# Patient Record
Sex: MALE | Race: WHITE | NOT HISPANIC OR LATINO | Employment: OTHER | URBAN - NONMETROPOLITAN AREA
[De-identification: names, ages, dates, MRNs, and addresses within clinical notes are randomized per-mention and may not be internally consistent; named-entity substitution may affect disease eponyms.]

---

## 2018-01-31 ENCOUNTER — OFFICE VISIT CONVERTED (OUTPATIENT)
Dept: FAMILY MEDICINE CLINIC | Age: 63
End: 2018-01-31
Attending: FAMILY MEDICINE

## 2018-04-04 ENCOUNTER — OFFICE VISIT CONVERTED (OUTPATIENT)
Dept: NEUROLOGY | Facility: CLINIC | Age: 63
End: 2018-04-04
Attending: PSYCHIATRY & NEUROLOGY

## 2018-04-04 ENCOUNTER — CONVERSION ENCOUNTER (OUTPATIENT)
Dept: NEUROLOGY | Facility: CLINIC | Age: 63
End: 2018-04-04

## 2018-06-30 ENCOUNTER — OFFICE VISIT CONVERTED (OUTPATIENT)
Dept: FAMILY MEDICINE CLINIC | Age: 63
End: 2018-06-30
Attending: FAMILY MEDICINE

## 2018-08-17 ENCOUNTER — OFFICE VISIT CONVERTED (OUTPATIENT)
Dept: FAMILY MEDICINE CLINIC | Age: 63
End: 2018-08-17
Attending: FAMILY MEDICINE

## 2018-10-05 ENCOUNTER — LAB REQUISITION (OUTPATIENT)
Dept: LAB | Facility: HOSPITAL | Age: 63
End: 2018-10-05

## 2018-10-05 DIAGNOSIS — Z00.00 ROUTINE GENERAL MEDICAL EXAMINATION AT A HEALTH CARE FACILITY: ICD-10-CM

## 2018-10-05 PROCEDURE — 36415 COLL VENOUS BLD VENIPUNCTURE: CPT

## 2018-10-24 ENCOUNTER — OFFICE VISIT CONVERTED (OUTPATIENT)
Dept: NEUROLOGY | Facility: CLINIC | Age: 63
End: 2018-10-24
Attending: PSYCHIATRY & NEUROLOGY

## 2018-10-24 ENCOUNTER — CONVERSION ENCOUNTER (OUTPATIENT)
Dept: NEUROLOGY | Facility: CLINIC | Age: 63
End: 2018-10-24

## 2018-11-14 ENCOUNTER — OFFICE VISIT CONVERTED (OUTPATIENT)
Dept: FAMILY MEDICINE CLINIC | Age: 63
End: 2018-11-14
Attending: FAMILY MEDICINE

## 2018-12-07 ENCOUNTER — OFFICE VISIT CONVERTED (OUTPATIENT)
Dept: PHYSICAL THERAPY | Facility: CLINIC | Age: 63
End: 2018-12-07
Attending: ORTHOPAEDIC SURGERY

## 2019-01-23 ENCOUNTER — OFFICE VISIT CONVERTED (OUTPATIENT)
Dept: NEUROLOGY | Facility: CLINIC | Age: 64
End: 2019-01-23
Attending: PSYCHIATRY & NEUROLOGY

## 2019-01-31 ENCOUNTER — OFFICE VISIT CONVERTED (OUTPATIENT)
Dept: ORTHOPEDIC SURGERY | Facility: CLINIC | Age: 64
End: 2019-01-31
Attending: ORTHOPAEDIC SURGERY

## 2019-02-13 ENCOUNTER — OFFICE VISIT CONVERTED (OUTPATIENT)
Dept: NEUROLOGY | Facility: CLINIC | Age: 64
End: 2019-02-13
Attending: PHYSICIAN ASSISTANT

## 2019-04-24 ENCOUNTER — OFFICE VISIT CONVERTED (OUTPATIENT)
Dept: NEUROLOGY | Facility: CLINIC | Age: 64
End: 2019-04-24
Attending: PSYCHIATRY & NEUROLOGY

## 2019-04-24 ENCOUNTER — CONVERSION ENCOUNTER (OUTPATIENT)
Dept: NEUROLOGY | Facility: CLINIC | Age: 64
End: 2019-04-24

## 2019-05-21 ENCOUNTER — OFFICE VISIT CONVERTED (OUTPATIENT)
Dept: FAMILY MEDICINE CLINIC | Age: 64
End: 2019-05-21
Attending: FAMILY MEDICINE

## 2019-05-23 ENCOUNTER — HOSPITAL ENCOUNTER (OUTPATIENT)
Dept: OTHER | Facility: HOSPITAL | Age: 64
Discharge: HOME | End: 2019-05-23
Attending: FAMILY MEDICINE

## 2019-05-23 LAB
ALBUMIN SERPL-MCNC: 4.5 G/DL (ref 3.5–5)
ALBUMIN/GLOB SERPL: 1.7 {RATIO} (ref 1.4–2.6)
ALP SERPL-CCNC: 64 U/L (ref 56–155)
ALT SERPL-CCNC: 24 U/L (ref 10–40)
ANION GAP SERPL CALC-SCNC: 22 MMOL/L (ref 8–19)
AST SERPL-CCNC: 22 U/L (ref 15–50)
BASOPHILS # BLD MANUAL: 0.02 10*3/UL (ref 0–0.2)
BASOPHILS NFR BLD MANUAL: 0.3 % (ref 0–3)
BILIRUB SERPL-MCNC: 0.45 MG/DL (ref 0.2–1.3)
BUN SERPL-MCNC: 24 MG/DL (ref 5–25)
BUN/CREAT SERPL: 24 {RATIO} (ref 6–20)
CALCIUM SERPL-MCNC: 9.4 MG/DL (ref 8.7–10.4)
CHLORIDE SERPL-SCNC: 107 MMOL/L (ref 99–111)
CHOLEST SERPL-MCNC: 245 MG/DL (ref 107–200)
CHOLEST/HDLC SERPL: 4.9 {RATIO} (ref 3–6)
CONV CO2: 23 MMOL/L (ref 22–32)
CONV TOTAL PROTEIN: 7.2 G/DL (ref 6.3–8.2)
CREAT UR-MCNC: 1.01 MG/DL (ref 0.7–1.2)
DEPRECATED RDW RBC AUTO: 45 FL
EOSINOPHIL # BLD MANUAL: 0.15 10*3/UL (ref 0–0.7)
EOSINOPHIL NFR BLD MANUAL: 2.4 % (ref 0–7)
ERYTHROCYTE [DISTWIDTH] IN BLOOD BY AUTOMATED COUNT: 13.3 % (ref 11.5–14.5)
GFR SERPLBLD BASED ON 1.73 SQ M-ARVRAT: >60 ML/MIN/{1.73_M2}
GLOBULIN UR ELPH-MCNC: 2.7 G/DL (ref 2–3.5)
GLUCOSE SERPL-MCNC: 95 MG/DL (ref 70–99)
GRANS (ABSOLUTE): 3.18 10*3/UL (ref 2–8)
GRANS: 50.9 % (ref 30–85)
HBA1C MFR BLD: 15.2 G/DL (ref 14–18)
HCT VFR BLD AUTO: 45.3 % (ref 42–52)
HDLC SERPL-MCNC: 50 MG/DL (ref 40–60)
IMM GRANULOCYTES # BLD: 0.02 10*3/UL (ref 0–0.54)
IMM GRANULOCYTES NFR BLD: 0.3 % (ref 0–0.43)
LDLC SERPL CALC-MCNC: 175 MG/DL (ref 70–100)
LYMPHOCYTES # BLD MANUAL: 2.3 10*3/UL (ref 1–5)
LYMPHOCYTES NFR BLD MANUAL: 9.4 % (ref 3–10)
MCH RBC QN AUTO: 30.5 PG (ref 27–31)
MCHC RBC AUTO-ENTMCNC: 33.6 G/DL (ref 33–37)
MCV RBC AUTO: 91 FL (ref 80–96)
MONOCYTES # BLD AUTO: 0.59 10*3/UL (ref 0.2–1.2)
OSMOLALITY SERPL CALC.SUM OF ELEC: 310 MOSM/KG (ref 273–304)
PLATELET # BLD AUTO: 197 10*3/UL (ref 130–400)
PMV BLD AUTO: 9.9 FL (ref 7.4–10.4)
POTASSIUM SERPL-SCNC: 4.3 MMOL/L (ref 3.5–5.3)
PSA SERPL-MCNC: 0.93 NG/ML (ref 0–4)
RBC # BLD AUTO: 4.98 10*6/UL (ref 4.7–6.1)
SODIUM SERPL-SCNC: 148 MMOL/L (ref 135–147)
TRIGL SERPL-MCNC: 100 MG/DL (ref 40–150)
VARIANT LYMPHS NFR BLD MANUAL: 36.7 % (ref 20–45)
VLDLC SERPL-MCNC: 20 MG/DL (ref 5–37)
WBC # BLD AUTO: 6.26 10*3/UL (ref 4.8–10.8)

## 2019-05-24 LAB
CONV HEPATITIS C AB WITH REFLEX TO CONFIRMATION: <0.1 S/CO RATIO (ref 0–0.9)
CONV HEPATITIS COMMENT: NORMAL

## 2019-09-09 ENCOUNTER — OFFICE VISIT CONVERTED (OUTPATIENT)
Dept: FAMILY MEDICINE CLINIC | Age: 64
End: 2019-09-09
Attending: FAMILY MEDICINE

## 2019-09-09 ENCOUNTER — HOSPITAL ENCOUNTER (OUTPATIENT)
Dept: OTHER | Facility: HOSPITAL | Age: 64
Discharge: HOME | End: 2019-09-09
Attending: FAMILY MEDICINE

## 2019-09-09 LAB
ALBUMIN SERPL-MCNC: 4.5 G/DL (ref 3.5–5)
ALBUMIN/GLOB SERPL: 1.7 {RATIO} (ref 1.4–2.6)
ALP SERPL-CCNC: 66 U/L (ref 56–155)
ALT SERPL-CCNC: 39 U/L (ref 10–40)
ANION GAP SERPL CALC-SCNC: 18 MMOL/L (ref 8–19)
AST SERPL-CCNC: 30 U/L (ref 15–50)
BILIRUB SERPL-MCNC: 0.51 MG/DL (ref 0.2–1.3)
BUN SERPL-MCNC: 19 MG/DL (ref 5–25)
BUN/CREAT SERPL: 22 {RATIO} (ref 6–20)
CALCIUM SERPL-MCNC: 9.6 MG/DL (ref 8.7–10.4)
CHLORIDE SERPL-SCNC: 104 MMOL/L (ref 99–111)
CHOLEST SERPL-MCNC: 182 MG/DL (ref 107–200)
CHOLEST/HDLC SERPL: 3.3 {RATIO} (ref 3–6)
CONV CO2: 24 MMOL/L (ref 22–32)
CONV TOTAL PROTEIN: 7.1 G/DL (ref 6.3–8.2)
CREAT UR-MCNC: 0.86 MG/DL (ref 0.7–1.2)
GFR SERPLBLD BASED ON 1.73 SQ M-ARVRAT: >60 ML/MIN/{1.73_M2}
GLOBULIN UR ELPH-MCNC: 2.6 G/DL (ref 2–3.5)
GLUCOSE SERPL-MCNC: 80 MG/DL (ref 70–99)
HDLC SERPL-MCNC: 56 MG/DL (ref 40–60)
LDLC SERPL CALC-MCNC: 98 MG/DL (ref 70–100)
OSMOLALITY SERPL CALC.SUM OF ELEC: 295 MOSM/KG (ref 273–304)
POTASSIUM SERPL-SCNC: 4.4 MMOL/L (ref 3.5–5.3)
SODIUM SERPL-SCNC: 142 MMOL/L (ref 135–147)
TRIGL SERPL-MCNC: 141 MG/DL (ref 40–150)
VLDLC SERPL-MCNC: 28 MG/DL (ref 5–37)

## 2019-12-06 ENCOUNTER — OFFICE VISIT CONVERTED (OUTPATIENT)
Dept: FAMILY MEDICINE CLINIC | Age: 64
End: 2019-12-06
Attending: NURSE PRACTITIONER

## 2020-01-13 ENCOUNTER — OFFICE VISIT CONVERTED (OUTPATIENT)
Dept: NEUROLOGY | Facility: CLINIC | Age: 65
End: 2020-01-13
Attending: PSYCHIATRY & NEUROLOGY

## 2020-02-18 ENCOUNTER — OFFICE VISIT CONVERTED (OUTPATIENT)
Dept: NEUROLOGY | Facility: CLINIC | Age: 65
End: 2020-02-18
Attending: PSYCHIATRY & NEUROLOGY

## 2020-03-25 ENCOUNTER — OFFICE VISIT CONVERTED (OUTPATIENT)
Dept: FAMILY MEDICINE CLINIC | Age: 65
End: 2020-03-25
Attending: FAMILY MEDICINE

## 2020-05-28 ENCOUNTER — OFFICE VISIT CONVERTED (OUTPATIENT)
Dept: FAMILY MEDICINE CLINIC | Age: 65
End: 2020-05-28
Attending: FAMILY MEDICINE

## 2020-11-18 ENCOUNTER — OFFICE VISIT CONVERTED (OUTPATIENT)
Dept: FAMILY MEDICINE CLINIC | Age: 65
End: 2020-11-18
Attending: FAMILY MEDICINE

## 2021-01-12 ENCOUNTER — IMMUNIZATION (OUTPATIENT)
Dept: VACCINE CLINIC | Facility: HOSPITAL | Age: 66
End: 2021-01-12

## 2021-01-12 PROCEDURE — 0001A: CPT | Performed by: INTERNAL MEDICINE

## 2021-01-12 PROCEDURE — 91300 HC SARSCOV02 VAC 30MCG/0.3ML IM: CPT | Performed by: INTERNAL MEDICINE

## 2021-02-02 ENCOUNTER — IMMUNIZATION (OUTPATIENT)
Dept: VACCINE CLINIC | Facility: HOSPITAL | Age: 66
End: 2021-02-02

## 2021-02-02 PROCEDURE — 0002A: CPT | Performed by: INTERNAL MEDICINE

## 2021-02-02 PROCEDURE — 91300 HC SARSCOV02 VAC 30MCG/0.3ML IM: CPT | Performed by: INTERNAL MEDICINE

## 2021-05-15 VITALS — OXYGEN SATURATION: 95 % | WEIGHT: 210 LBS | BODY MASS INDEX: 31.83 KG/M2 | HEIGHT: 68 IN | HEART RATE: 64 BPM

## 2021-05-15 VITALS
HEART RATE: 66 BPM | HEIGHT: 68 IN | SYSTOLIC BLOOD PRESSURE: 122 MMHG | DIASTOLIC BLOOD PRESSURE: 73 MMHG | BODY MASS INDEX: 30.94 KG/M2 | WEIGHT: 204.12 LBS

## 2021-05-15 VITALS — HEIGHT: 68 IN | OXYGEN SATURATION: 96 % | BODY MASS INDEX: 31.37 KG/M2 | HEART RATE: 94 BPM | WEIGHT: 207 LBS

## 2021-05-15 VITALS
BODY MASS INDEX: 31.22 KG/M2 | HEART RATE: 64 BPM | SYSTOLIC BLOOD PRESSURE: 145 MMHG | WEIGHT: 206 LBS | DIASTOLIC BLOOD PRESSURE: 81 MMHG | HEIGHT: 68 IN

## 2021-05-15 VITALS
BODY MASS INDEX: 33.04 KG/M2 | HEIGHT: 68 IN | DIASTOLIC BLOOD PRESSURE: 74 MMHG | SYSTOLIC BLOOD PRESSURE: 148 MMHG | HEART RATE: 69 BPM | WEIGHT: 218 LBS

## 2021-05-15 VITALS
BODY MASS INDEX: 32.13 KG/M2 | HEART RATE: 65 BPM | WEIGHT: 212 LBS | DIASTOLIC BLOOD PRESSURE: 82 MMHG | HEIGHT: 68 IN | SYSTOLIC BLOOD PRESSURE: 145 MMHG

## 2021-05-16 VITALS
WEIGHT: 200 LBS | SYSTOLIC BLOOD PRESSURE: 134 MMHG | DIASTOLIC BLOOD PRESSURE: 59 MMHG | BODY MASS INDEX: 30.31 KG/M2 | HEART RATE: 74 BPM | HEIGHT: 68 IN

## 2021-05-16 VITALS
HEIGHT: 68 IN | SYSTOLIC BLOOD PRESSURE: 130 MMHG | DIASTOLIC BLOOD PRESSURE: 76 MMHG | WEIGHT: 199.25 LBS | HEART RATE: 67 BPM | BODY MASS INDEX: 30.2 KG/M2

## 2021-05-18 NOTE — PROGRESS NOTES
William Turcios 1955     Office/Outpatient Visit    Visit Date: Wed, Nov 14, 2018 08:38 am    Provider: Walt Giraldo MD (Assistant: Riddhi Bower MA)    Location: [facility]        Electronically signed by Walt Giraldo MD on  11/14/2018 12:47:25 PM                             SUBJECTIVE:        CC:     Mr. Turcios is a 63 year old White male.  Patient is here for medication consultation, various joint pain, and possible referral.          HPI:         Patient to be evaluated for hTN.  He did not bring his blood pressure diary, but says that pressures have been okay.  He is tolerating the medication well without side effects.  Compliance with treatment has been good.          In regard to the high cholesterol (declines meds), current treatment includes diet.  Compliance with treatment has been fair.  Most recent lab tests include Total Cholesterol:  229 (mg/dL) (03/02/2018), HDL:  48 (mg/dL) (03/02/2018), Triglycerides:  142 (mg/dL) (03/02/2018), LDL:  153 (mg/dL) (03/02/2018).  STOPPED MEDS DUE TO CONCERNS     ROS:     CONSTITUTIONAL:  Negative for chills and fever.      E/N/T:  Negative for ear pain, nasal congestion and sore throat.      CARDIOVASCULAR:  Negative for chest pain, palpitations and pedal edema.      RESPIRATORY:  Negative for recent cough and dyspnea.      GASTROINTESTINAL:  Negative for abdominal pain, nausea and vomiting.      MUSCULOSKELETAL:  Positive for arthralgias (RIGHT KNEE, LEFT HAND.  [facility] LIKE TO SEE [facility]TH[facility]).   Negative for myalgias.      NEUROLOGICAL:  Negative for headaches and weakness.          PMH/FMH/SH:     Last Reviewed on 11/14/2018 08:52 AM by Walt Giraldo    Past Medical History:             PAST MEDICAL HISTORY             CURRENT MEDICAL PROVIDERS:    Neurologist    Pulmonologist         Surgical History:         Positive for    Laminectomy: lumbar region; ;     Positive for    Colonoscopy ( 2005, 2015--NEG;; ) and    Treadmill stress test  ( 2012, 2015--NEG;; );         Family History:         Positive for Coronary Artery Disease and Hypertension.          Social History:     Occupation: TEACHER;     Marital Status:          Tobacco/Alcohol/Supplements:     Last Reviewed on 11/14/2018 08:52 AM by Walt Giraldo    Tobacco: He has never smoked.  Non-drinker     Caffeine:  He admits to consuming caffeine via coffee ( 2 servings per day ).          Substance Abuse History:     Last Reviewed on 11/14/2018 08:52 AM by Walt Giraldo    NEGATIVE         Mental Health History:     Last Reviewed on 11/14/2018 08:40 AM by Riddhi Bower        Communicable Diseases (eg STDs):     Last Reviewed on 11/14/2018 08:40 AM by Riddhi Bower            Current Problems:     Last Reviewed on 11/14/2018 08:54 AM by Walt Giraldo    Generalized osteoarthritis, multiple sites     Obstructive sleep apnea     HTN     High cholesterol (declines meds)     Depression responsive to treatment     Restless legs syndrome (RLS)         Immunizations:     Fluzone (3 + years dose) 10/1/2017     Fluzone Quadrivalent (3+ years) 11/5/2018         Allergies:     Last Reviewed on 11/14/2018 08:52 AM by Walt Giraldo      No Known Drug Allergies.         Current Medications:     Last Reviewed on 11/14/2018 08:53 AM by Walt Giraldo    Lisinopril 10mg Tablet 1 tab daily     Zoloft 50mg Tablet one a day     Mirapex 0.5mg Tablet 5 po QHS     MOUTHPIECE FOR LOTTIE         OBJECTIVE:        Vitals:         Current: 11/14/2018 8:41:07 AM    Ht:  5 ft, 7.5 in;  Wt: 200.6 lbs;  BMI: 31.0    T: 98 F (oral);  BP: 143/80 mm Hg (left arm, sitting);  P: 62 bpm (left arm (BP Cuff), sitting);  sCr: 0.99 mg/dL;  GFR: 75.59        Exams:     PHYSICAL EXAM:     GENERAL: Vitals recorded well developed, well nourished;  well groomed;  no apparent distress;     NECK: trachea is midline; thyroid is non-palpable;     RESPIRATORY: normal respiratory rate and  pattern with no distress; normal breath sounds with no rales, rhonchi, wheezes or rubs;     CARDIOVASCULAR: normal rate; rhythm is regular;  no systolic murmur; no edema;     GASTROINTESTINAL: nontender; no abdominal hernias;     LYMPHATIC: no enlargement of cervical or facial nodes;     NEUROLOGIC: GROSSLY INTACT     PSYCHIATRIC:  appropriate affect and demeanor; normal speech pattern; grossly normal memory;         ASSESSMENT           401.1   I10  HTN              DDx:     272.0   E78.00  High cholesterol (declines meds)              DDx:     715.09   M15.0  Generalized osteoarthritis, multiple sites              DDx:         ORDERS:         Meds Prescribed:       Refill of: Zoloft (Sertraline HCl) 50mg Tablet one a day  #45 (Forty Five) tablet(s) Refills: 1       Refill of: Mirapex (Pramipexole) 0.5mg Tablet 5 po QHS  #450 ([address] [place] [address] [employer]) tablet(s) Refills: 1         Procedures Ordered:       REFER  Referral to Specialist or Other Facility  (Send-Out)                   PLAN:          HTN         MEDICATIONS: (no change to current medication regimen)     RECOMMENDATIONS given include: perform routine monitoring of blood pressure with home blood pressure cuff.      FOLLOW-UP: Schedule a follow-up visit in 6 months. LABS IN THE SPRING          High cholesterol (declines meds)         RECOMMENDATIONS given include: low cholesterol/low fat diet.           Generalized osteoarthritis, multiple sites         REFERRALS:  Referral initiated to an orthopedist ( [facility] ).            Orders:       REFER  Referral to Specialist or Other Facility  (Send-Out)               Other Prescriptions:       Refill of: Zoloft (Sertraline HCl) 50mg Tablet one a day  #45 (Forty Five) tablet(s) Refills: 1       Refill of: Mirapex (Pramipexole) 0.5mg Tablet 5 po QHS  #450 ([address] [place] [address] [employer]) tablet(s) Refills: 1         Patient Recommendations:        For  HTN:     Begin monitoring your blood  pressure by brief nurse visits at our office, a home blood pressure monitor, or by checking on the machines in pharmacies or stores.  Keep a log of the readings.  Schedule a follow-up visit in 6 months.          For  High cholesterol (declines meds):     Reduce the amount of cholesterol and saturated fat in your diet.              CHARGE CAPTURE           **Please note: ICD descriptions below are intended for billing purposes only and may not represent clinical diagnoses**        Primary Diagnosis:         401.1 HTN            I10    Essential (primary) hypertension              Orders:          96928   Office/outpatient visit; established patient, level 4  (In-House)           272.0 High cholesterol (declines meds)            E78.00    Pure hypercholesterolemia, unspecified    715.09 Generalized osteoarthritis, multiple sites            M15.0    Primary generalized (osteo)arthritis

## 2021-05-18 NOTE — PROGRESS NOTES
William Turcios  1955     Office/Outpatient Visit    Visit Date: Thu, May 28, 2020 10:26 am    Provider: Chivo Giraldo MD (Assistant: Dominique Beard MA)    Location: [facility]        Electronically signed by Chivo Giraldo MD on  05/28/2020 01:43:59 PM                             Subjective:        CC: [phone] - dox video Mr. Turcios is a 65 year old White male.  This is a follow-up visit.  W Today's encounter is being done with a telehealth visit. He has consented verbally with two witnesses for todays treatment. Todays visit is being conducted by audio and video. Individuals present during the telemedicine consultation include CHIVO WEEMS MD         HPI:           Mr. Turcios is here for a Medicare wellness visit.  The required HRA questions are integrated within this visit note. Family medical history and individual medical/surgical history were reviewed and updated.  A current height, weight, BMI, blood pressure, and pulse were recorded in the vitals section of the note and have been reviewed. Patient's medications, including supplements, were recorded in the chart and reviewed.  Current providers and suppliers were reviewed and updated.          Self-Assessment of Health: He rates his health as good. He rates his confidence of being able to control/manage most of his health problems as very confident. His physical/emotional health has limited his social activites not at all.  A review of possible cognitive impairment was performed and the following was noted: Memory Impairment Screen is normal.  A review of functional ability, including bathing, dressing, walking, and urine/bowel continence as well as level of safety was performed and was found to be negative.  Falls Risk: Has not had any falls or only one fall without injury in the past year.  He denies having trouble hearing the TV/radio when others do not, having to strain to hear or understand  conversations and wearing hearing aid(s).  Concerning home safety, he reports that at home he DOES have adequate lighting, a skid resistant shower/tub, grab bars in the bath, handrails on stairs, functioning smoke alarms and absence of throw rugs.          Immunization Status: ** Has not received pneumococcal vaccination; Physical Activity: He exercises for at least 20 minutes 3 or more days/week.; Type of diet patient normally eats is described as well-balanced with fruits and vegetables Tobacco: He has never smoked.  Preventative Health updated today.            PHQ-9 Depression Screening: Completed form scanned and in chart; Total Score 5     ROS:     CONSTITUTIONAL:  Negative for chills and fever.      EYES:  Negative for blurred vision and eye drainage.      E/N/T:  Negative for ear pain, nasal congestion and sore throat.      CARDIOVASCULAR:  Negative for chest pain, palpitations and pedal edema.      RESPIRATORY:  Negative for recent cough and dyspnea.      GASTROINTESTINAL:  Negative for abdominal pain, anorexia, constipation, diarrhea, nausea and vomiting.      GENITOURINARY:  Negative for dysuria and hematuria.      MUSCULOSKELETAL:  Negative for myalgias.      NEUROLOGICAL:  Positive for RLS MEDS NOT WORKING, NEG NEURO EVAL.   Negative for headaches.      PSYCHIATRIC:  Positive for depression ( (MEDS WORKING WELL) ) and insomnia.          Past Medical History / Family History / Social History:         Last Reviewed on 5/28/2020 10:55 AM by Walt Giraldo    Past Medical History:             PAST MEDICAL HISTORY             CURRENT MEDICAL PROVIDERS:    Dermatologist         PREVENTIVE HEALTH MAINTENANCE             EVALUATION FOR AORTIC ANEURYSM: has never been done and has no medical need for one at this time     COLORECTAL CANCER SCREENING: Up to date (colonoscopy q10y; sigmoidoscopy q5y; Cologuard q3y) was last done 2015, Results are in chart; colonoscopy with normal results; The next  colonoscopy is due  2020     DENTAL CLEANING: was last done 2019     EYE EXAM: was last done 2019     Hepatitis C Medicare Screening: was last done 2019     PSA: was last done 2019 with normal results         Surgical History:         Positive for    Laminectomy: lumbar region; ;     Positive for    Treadmill stress test ( 2012, 2015--NEG;; );         Family History:         Positive for Coronary Artery Disease and Hypertension.          Social History:     Occupation: Retired (Prior occupation: Dentist)     Marital Status:          Tobacco/Alcohol/Supplements:     Last Reviewed on 5/28/2020 10:55 AM by Walt Giraldo    Tobacco: He has never smoked.  Non-drinker     Caffeine:  He admits to consuming caffeine via coffee ( 2 servings per day ).          Substance Abuse History:     Last Reviewed on 5/28/2020 10:55 AM by Walt Giraldo    NEGATIVE         Mental Health History:     Last Reviewed on 11/14/2018 08:40 AM by Riddhi Bower        Communicable Diseases (eg STDs):     Last Reviewed on 11/14/2018 08:40 AM by Riddhi Bower        Current Problems:     Last Reviewed on 5/28/2020 10:55 AM by Walt Giraldo    Pure hypercholesterolemia    Dysthymic disorder    Restless legs syndrome    Essential (primary) hypertension    Obstructive sleep apnea    Primary generalized (osteo)arthritis    History of actinic keratosis    CTS (mild)    History of basal cell carcinoma of skin of unspecified ear    Encounter for general adult medical examination without abnormal findings    Encounter for screening for depression    Family history of malignant neoplasm of the colon        Immunizations:     influenza, injectable, quadrivalent, preservative free 12/6/2019    Fluzone (3 + years dose) 10/1/2017    Fluzone Quadrivalent (3+ years) 11/5/2018        Allergies:     Last Reviewed on 5/28/2020 10:55 AM by Walt Giraldo    No Known Allergies.        Current Medications:     Last  Reviewed on 5/28/2020 10:55 AM by Walt Giraldo    Zoloft 50 mg oral tablet [one a day]    Lisinopril 10 mg oral tablet [1 tab daily]    Lipitor 20 mg oral tablet [TAKE 1 TABLET AT BEDTIME]    rOPINIRole 1 mg oral tablet [take 1 tablet (1 mg) by oral route 1-3 hours before bedtime]    Neupro 2 mg/24 hour Transdermal Patch, Transdermal 24 Hours [apply 1 patch (2 mg) by transdermal route once daily . Do not apply to same area more than once every 14 days.]        Objective:        Exams:     PHYSICAL EXAM:     GENERAL: Vitals recorded well developed, well nourished;  well groomed;  no apparent distress;     RESPIRATORY: normal appearance and symmetric expansion of chest wall;     PSYCHIATRIC:  appropriate affect and demeanor; normal speech pattern; grossly normal memory;         Assessment:         Z00.00   Encounter for general adult medical examination without abnormal findings       Z13.31   Encounter for screening for depression       G25.81   Restless legs syndrome           ORDERS:         Meds Prescribed:       [New Rx] doxepin 10 mg oral capsule [take 1 capsule (10 mg) by oral route Q HS], #30 (thirty) capsules, Refills: 0 (zero)       [New Rx] Mirapex ER 0.75 mg oral Tablet, Extended Release 24 hr [take 4 tablets (3.00 mg) by oral route once daily], #120 (one hundred and twenty) tablets, Refills: 0 (zero)         Radiology/Test Orders:       3017F  Colorectal CA screen results documented and reviewed (PV)  (In-House)              Other Orders:         Depression screen negative  (In-House)            1101F  Pt screen for fall risk; document no falls in past year or only 1 fall w/o injury in past year (TARSHA)  (In-House)              Welcome to Medicare  (In-House)                      Plan:         Encounter for general adult medical examination without abnormal findings        COUNSELING was provided today regarding the following topics: healthy eating habits, regular exercise, alcohol, use  of seat belts, fall prevention, Medicare Preventive Service Guide given to patient., ADVISED TO SEE AN EYE DOCTOR AND A DENTIST REGULARLY, and Given Home Safety Handout.      FOLLOW-UP: Schedule a follow-up visit in 6 months.            Orders:         Welcome to Medicare  (In-House)              Restless legs syndrome    MIPS Negative Depression Screen           Prescriptions:       [New Rx] doxepin 10 mg oral capsule [take 1 capsule (10 mg) by oral route Q HS], #30 (thirty) capsules, Refills: 0 (zero)       [New Rx] Mirapex ER 0.75 mg oral Tablet, Extended Release 24 hr [take 4 tablets (3.00 mg) by oral route once daily], #120 (one hundred and twenty) tablets, Refills: 0 (zero)           Orders:         Depression screen negative  (In-House)            1101F  Pt screen for fall risk; document no falls in past year or only 1 fall w/o injury in past year (TARSHA)  (In-House)            3017F  Colorectal CA screen results documented and reviewed (PV)  (In-House)                  Patient Recommendations:        For  Encounter for general adult medical examination without abnormal findings:    Limit dietary intake of fat (especially saturated fat) and cholesterol.  Eat a variety of foods, including plenty of fruits, vegetables, and grain containg fiber, limit fat intake to 30% of total calories. Balance caloric intake with energy expended. Maintaining regular physical activity is advised to help prevent heart disease, hypertension, diabetes, and obesity. Always use shoulder/lap restraints when driving or riding in a vehicle, even those equipped with air bags. Regularly exercise within recommended guidelines, especially to maintain balance. Remove obstacles in walkways at home.  Use non-skid material for bathtub safety.  Schedule a follow-up visit in 6 months.              Charge Capture:         Primary Diagnosis:     Z00.00  Encounter for general adult medical examination without abnormal findings            Orders:        Welcome to Medicare  (In-House)              Z13.31  Encounter for screening for depression     G25.81  Restless legs syndrome           Orders:        Depression screen negative  (In-House)            1101F  Pt screen for fall risk; document no falls in past year or only 1 fall w/o injury in past year (TARSHA)  (In-House)            3017F  Colorectal CA screen results documented and reviewed (PV)  (In-House)

## 2021-05-18 NOTE — PROGRESS NOTES
William Turcios 1955     Office/Outpatient Visit    Visit Date: Sat, Jun 30, 2018 08:34 am    Provider: Walt Giraldo MD (Assistant: Enrico Nowak LPN)    Location: [facility]        Electronically signed by Walt Giraldo MD on  06/30/2018 01:37:27 PM                             SUBJECTIVE:        CC:     Mr. Turcios is a 63 year old White male.  does not take zoloft on a regular basis, no cpap, had not taken lisinopril for a couple of days, paperwork for [employer] trip         HPI:         Mr. Turcios presents with hTN.  His current cardiac medication regimen includes an ACE inhibitor.  He has not kept a blood pressure diary, but states that pressures have been well controlled.  Compliance with treatment has been fair.  HAS NOT TAKEN MED FOR A FEW DAYS         With regard to the high cholesterol (declines meds), current treatment includes diet.  Compliance with treatment has been fair.  Most recent lab tests include Total Cholesterol:  229 (mg/dL) (03/02/2018), HDL:  48 (mg/dL) (03/02/2018), Triglycerides:  142 (mg/dL) (03/02/2018), LDL:  153 (mg/dL) (03/02/2018).  STOPPED MEDS DUE TO CONCERNS     ROS:     CONSTITUTIONAL:  Negative for chills and fever.      E/N/T:  Negative for ear pain, nasal congestion and sore throat.      CARDIOVASCULAR:  Negative for chest pain, palpitations and pedal edema.      RESPIRATORY:  Negative for recent cough and dyspnea.      GASTROINTESTINAL:  Negative for abdominal pain, nausea and vomiting.      GENITOURINARY:  Negative for dysuria, hematuria, impotence and nocturia.      MUSCULOSKELETAL:  Positive for arthralgias (OCC, R KNEE).   Negative for myalgias.      NEUROLOGICAL:  Negative for headaches and weakness.          PMH/FMH/SH:     Last Reviewed on 6/30/2018 08:51 AM by Walt Giraldo    Past Medical History:             PAST MEDICAL HISTORY             CURRENT MEDICAL PROVIDERS:    Neurologist    Pulmonologist         Surgical History:          Positive for    Laminectomy: lumbar region; ;     Positive for    Colonoscopy ( 2005, 2015--NEG;; ) and    Treadmill stress test ( 2012, 2015--NEG;; );         Family History:         Positive for Coronary Artery Disease and Hypertension.          Social History:     Occupation: TEACHER;     Marital Status:          Tobacco/Alcohol/Supplements:     Last Reviewed on 6/30/2018 08:50 AM by Walt Giraldo    Tobacco: He has never smoked.  Non-drinker     Caffeine:  He admits to consuming caffeine via coffee ( 2 servings per day ).          Substance Abuse History:     Last Reviewed on 6/30/2018 08:50 AM by Walt Giraldo    NEGATIVE             Current Problems:     Last Reviewed on 6/30/2018 08:53 AM by Walt Giraldo    Obstructive sleep apnea     HTN     High cholesterol (declines meds)     Depression responsive to treatment     Restless legs syndrome (RLS)         Immunizations:     Fluzone (3 + years dose) 10/1/2017         Allergies:     Last Reviewed on 6/30/2018 08:50 AM by Walt Giraldo      No Known Drug Allergies.         Current Medications:     Last Reviewed on 6/30/2018 08:52 AM by Walt Giraldo    Lisinopril 10mg Tablet 1 tab daily     Mirapex 0.5mg Tablet 5 po QHS     Zoloft 50mg Tablet 1/2 a day     CPAP         OBJECTIVE:        Vitals:         Current: 6/30/2018 8:37:11 AM    Ht:  5 ft, 7.5 in;  Wt: 200.4 lbs;  BMI: 30.9    T: 96.9 F (oral);  BP: 174/102 mm Hg (right arm, sitting);  P: 74 bpm (right arm (BP Cuff), sitting);  sCr: 0.99 mg/dL;  GFR: 75.56        Repeat:     8:46:16 AM     BP:   166/96mm Hg (right arm, sitting)     8:47:25 AM     BP:   160/90mm Hg (left arm, sitting)         Exams:     PHYSICAL EXAM:     GENERAL: Vitals recorded well developed, well nourished;  well groomed;  no apparent distress;     NECK: trachea is midline; thyroid is non-palpable;     RESPIRATORY: normal respiratory rate and pattern with no distress; normal  breath sounds with no rales, rhonchi, wheezes or rubs;     CARDIOVASCULAR: normal rate; rhythm is regular;  no systolic murmur; no edema;     GASTROINTESTINAL: nontender; no abdominal hernias;     LYMPHATIC: no enlargement of cervical or facial nodes;     NEUROLOGIC: GROSSLY INTACT     PSYCHIATRIC:  appropriate affect and demeanor; normal speech pattern; grossly normal memory;         ASSESSMENT           401.1   I10  HTN              DDx:     272.0   E78.00  High cholesterol (declines meds)              DDx:         ORDERS:         Meds Prescribed:       Refill of: Lisinopril 10mg Tablet 1 tab daily  #90 (Ninety) tablet(s) Refills: 0       Refill of: Mirapex (Pramipexole) 0.5mg Tablet 5 po QHS  #450 ([address] [place] [address] [employer]) tablet(s) Refills: 0       Refill of: Zoloft (Sertraline HCl) 50mg Tablet 1/2 a day  #45 (Forty Five) tablet(s) Refills: 0                 PLAN:          HTN         MEDICATIONS: (no change to current medication regimen)     RECOMMENDATIONS given include: perform routine monitoring of blood pressure with home blood pressure cuff.      FOLLOW-UP: Schedule a follow-up visit in 4 months. LABS IN THE FALL     HE WILL STOP BY IN A FEW DAYS TO REPEAT THE BP CHECK.           High cholesterol (declines meds)         RECOMMENDATIONS given include: low cholesterol/low fat diet.              Other Prescriptions:       Refill of: Lisinopril 10mg Tablet 1 tab daily  #90 (Ninety) tablet(s) Refills: 0       Refill of: Mirapex (Pramipexole) 0.5mg Tablet 5 po QHS  #450 ([address] [place] [address] [employer]) tablet(s) Refills: 0       Refill of: Zoloft (Sertraline HCl) 50mg Tablet 1/2 a day  #45 (Forty Five) tablet(s) Refills: 0         Patient Recommendations:        For  HTN:     Begin monitoring your blood pressure by brief nurse visits at our office, a home blood pressure monitor, or by checking on the machines in pharmacies or stores.  Keep a log of the readings.  Schedule a follow-up visit in 4 months.          For  High  cholesterol (declines meds):     Reduce the amount of cholesterol and saturated fat in your diet.              CHARGE CAPTURE           **Please note: ICD descriptions below are intended for billing purposes only and may not represent clinical diagnoses**        Primary Diagnosis:         401.1 HTN            I10    Essential (primary) hypertension              Orders:          11717   Office/outpatient visit; established patient, level 4  (In-House)           272.0 High cholesterol (declines meds)            E78.00    Pure hypercholesterolemia, unspecified        ADDENDUMS:      ____________________________________    Addendum: 07/02/2018 04:19 PM - Sneha Muñoz        b/p 147/, p-81./jam

## 2021-05-18 NOTE — PROGRESS NOTES
William Turcios 1955     Office/Outpatient Visit    Visit Date: Fri, Aug 17, 2018 11:30 am    Provider: Walt Giraldo MD (Assistant: Brenda Osborn LPN)    Location: [facility]        Electronically signed by Walt Giraldo MD on  08/17/2018 02:08:34 PM                             SUBJECTIVE:        CC:     Mr. Turcios is a 63 year old White male.  He presents with frequent urination.          HPI:         Patient complains of urinary frequency.  This was first noted several weeks ago.  He denies associated dribbling or intermittent urine stream, flank pain, hematuria, hesitancy, urgency and urinary incontinence.  Mr. Turcios states this is the first time he has experienced this kind of discomfort.      ROS:     CONSTITUTIONAL:  Negative for chills and fever.      GASTROINTESTINAL:  Negative for abdominal pain, nausea and vomiting.      MUSCULOSKELETAL:  Negative for myalgias.      NEUROLOGICAL:  Negative for headaches.          PMH/FMH/SH:     Last Reviewed on 8/17/2018 12:06 PM by Walt Giraldo    Past Medical History:             PAST MEDICAL HISTORY             CURRENT MEDICAL PROVIDERS:    Neurologist    Pulmonologist         Surgical History:         Positive for    Laminectomy: lumbar region; ;     Positive for    Colonoscopy ( 2005, 2015--NEG;; ) and    Treadmill stress test ( 2012, 2015--NEG;; );         Family History:         Positive for Coronary Artery Disease and Hypertension.          Social History:     Occupation: TEACHER;     Marital Status:          Tobacco/Alcohol/Supplements:     Last Reviewed on 8/17/2018 12:05 PM by Walt Giraldo    Tobacco: He has never smoked.  Non-drinker     Caffeine:  He admits to consuming caffeine via coffee ( 2 servings per day ).          Substance Abuse History:     Last Reviewed on 8/17/2018 12:05 PM by Walt Giraldo    NEGATIVE             Current Problems:     Last Reviewed on 8/17/2018 12:07 PM by  Walt Giraldo    Urinary frequency     Obstructive sleep apnea     HTN     High cholesterol (declines meds)     Depression responsive to treatment     Restless legs syndrome (RLS)         Immunizations:     Fluzone (3 + years dose) 10/1/2017         Allergies:     Last Reviewed on 8/17/2018 12:05 PM by Walt Giraldo      No Known Drug Allergies.         Current Medications:     Last Reviewed on 8/17/2018 12:06 PM by Walt Giraldo    Lisinopril 10mg Tablet 1 tab daily     Mirapex 0.5mg Tablet 5 po QHS     MOUTHPIECE FOR LOTTIE         OBJECTIVE:        Vitals:         Current: 8/17/2018 11:38:02 AM    Ht:  5 ft, 7.5 in;  Wt: 199 lbs;  BMI: 30.7    T: 97.9 F (oral);  BP: 137/78 mm Hg (left arm, sitting);  P: 70 bpm (left arm (BP Cuff), sitting);  sCr: 0.99 mg/dL;  GFR: 75.33        Exams:     PHYSICAL EXAM:     GENERAL: Vitals recorded well developed, well nourished;  well groomed;  no apparent distress;     GENITOURINARY: prostate: NONE hypertrophy; no prostate tenderness; no prostatic nodules; S/W BOGGY;     LYMPHATICS:  no adenopathy in cervical, supraclavicular, axillary, or inguinal regions;     SKIN:  no significant rashes or lesions; no suspicious moles;     MUSCULOSKELETAL:  Normal range of motion, strength and tone;     NEUROLOGICAL:  cranial nerves, motor and sensory function, reflexes, gait and coordination are all intact;     PSYCHIATRIC:  appropriate affect and demeanor; normal speech pattern; grossly normal memory;         Lab/Test Results:         LABORATORY RESULTS:     U/A NEGATIVE;         ASSESSMENT           788.41   R35.0  Urinary frequency POSS PROSTATITIS, POSS OCCULT BPH.               DDx:         ORDERS:         Meds Prescribed:       Bactrim DS (Trimethoprim/Sulfamethoxazole ) Tablet 1 bid  #30 (Thirty) tablet(s) Refills: 0         Lab Orders:       [id number]  [facility] Urinalysis, automated, with micro  (Send-Out; Stat)                   PLAN:          Urinary  frequency         FOLLOW-UP: BY PHONE IN TWO WEEKS     CONSIDER TRIAL OF FLOMAX/UROLOGY EVAL           Prescriptions:       Bactrim DS (Trimethoprim/Sulfamethoxazole ) Tablet 1 bid  #30 (Thirty) tablet(s) Refills: 0           Orders:       [id number]  [facility] Urinalysis, automated, with micro  (Send-Out; Stat)               Patient Recommendations:        For  Urinary frequency:     I also recommend CONSIDER TRIAL OF FLOMAX/UROLOGY EVAL.              CHARGE CAPTURE           **Please note: ICD descriptions below are intended for billing purposes only and may not represent clinical diagnoses**        Primary Diagnosis:         788.41 Urinary frequency            R35.0    Frequency of micturition              Orders:          [id number]   Office/outpatient visit; established patient, level 3  (In-House)

## 2021-05-18 NOTE — PROGRESS NOTES
William Turcios  1955     Office/Outpatient Visit    Visit Date: Wed, Nov 18, 2020 11:13 am    Provider: Chivo Giraldo MD (Assistant: Salud Minor MA)    Location: [facility]        Electronically signed by Chivo Giraldo MD on  11/18/2020 03:19:56 PM                             Subjective:        CC: Mr. Turcios is a 65 year old White male.  This is a follow-up visit.  Follow up with medication refill. [facility] [phone] Today's encounter is being done with a telehealth visit. He has consented verbally with two witnesses for todays treatment. Todays visit is being conducted by audio and video. Individuals present during the telemedicine consultation include CHIVO WEEMS MD         HPI:           Patient to be evaluated for dysthymic disorder.  The diagnosis of depression was made several years ago.  Current medications include an antidepressant.  DOING WELL ON CURRENT MEDS           Complaint of restless legs syndrome..  The symptom began years ago.  The severity is of moderate intensity.  NEG EMG, NEURO VERIFIED DX.  HIGH DOSE MIRAPEX HELPS.  NEURONTIN DID NOT HELP           With regard to the essential (primary) hypertension, his current cardiac medication regimen includes an ACE inhibitor.  Compliance with treatment has been good.  He is tolerating the medication well without side effects.  He has not kept a blood pressure diary, but states that pressures have been well controlled.      ROS:     CONSTITUTIONAL:  Negative for chills and fever.      E/N/T:  Negative for ear pain, nasal congestion and sore throat.      CARDIOVASCULAR:  Negative for chest pain, palpitations and pedal edema.      RESPIRATORY:  Negative for recent cough and dyspnea.      GASTROINTESTINAL:  Negative for abdominal pain, nausea and vomiting.      MUSCULOSKELETAL:  Negative for myalgias.      NEUROLOGICAL:  Negative for headaches and RECENT FALLING.          Past Medical History /  Family History / Social History:         Last Reviewed on 5/28/2020 10:55 AM by Walt Giraldo    Past Medical History:             PAST MEDICAL HISTORY             CURRENT MEDICAL PROVIDERS:    Dermatologist         PREVENTIVE HEALTH MAINTENANCE             EVALUATION FOR AORTIC ANEURYSM: has never been done and has no medical need for one at this time     COLORECTAL CANCER SCREENING: Up to date (colonoscopy q10y; sigmoidoscopy q5y; Cologuard q3y) was last done 2015, Results are in chart; colonoscopy with normal results; The next colonoscopy is due  2020     DENTAL CLEANING: was last done 2019     EYE EXAM: was last done 2019     Hepatitis C Medicare Screening: was last done 2019     PSA: was last done 2019 with normal results         Surgical History:         Positive for    Laminectomy: lumbar region; ;     Positive for    Treadmill stress test ( 2012, 2015--NEG;; );         Family History:         Positive for Coronary Artery Disease and Hypertension.          Social History:     Occupation: Retired (Prior occupation: Dentist)     Marital Status:          Tobacco/Alcohol/Supplements:     Last Reviewed on 11/18/2020 11:15 AM by Salud Minor    Tobacco: He has never smoked.  Non-drinker     Caffeine:  He admits to consuming caffeine via coffee ( 2 servings per day ).          Substance Abuse History:     Last Reviewed on 5/28/2020 10:55 AM by Walt Giraldo    NEGATIVE         Mental Health History:     Last Reviewed on 11/14/2018 08:40 AM by Riddhi Bower        Communicable Diseases (eg STDs):     Last Reviewed on 11/14/2018 08:40 AM by Riddhi Bower        Current Problems:     Last Reviewed on 5/28/2020 10:55 AM by Walt Giraldo    Pure hypercholesterolemia    Dysthymic disorder    Restless legs syndrome    Essential (primary) hypertension    Obstructive sleep apnea (uses a mouthpiece)    Primary generalized (osteo)arthritis    CTS (mild)    History of actinic  keratosis    History of basal cell carcinoma of skin of unspecified ear        Immunizations:     influenza, high-dose, quadrivalent 10/19/2020    influenza, injectable, quadrivalent, preservative free 12/6/2019    Fluzone (3 + years dose) 10/1/2017    Fluzone Quadrivalent (3+ years) 11/5/2018        Allergies:     Last Reviewed on 11/18/2020 11:14 AM by Salud Minor    No Known Allergies.        Current Medications:     Last Reviewed on 11/18/2020 11:14 AM by Salud Minor    lisinopriL 10 mg oral tablet [TAKE 1 TABLET DAILY]    Zoloft 50 mg oral tablet [TAKE 1 TABLET DAILY]    Lipitor 20 mg oral tablet [TAKE 1 TABLET AT BEDTIME]    Mirapex ER 0.75 mg oral Tablet, Extended Release 24 hr [take 4 tablets (3.00 mg) by oral route once daily]        Objective:        Exams:     PHYSICAL EXAM:     GENERAL: Vitals recorded well developed, well nourished;  well groomed;  no apparent distress;     RESPIRATORY: normal appearance and symmetric expansion of chest wall;     PSYCHIATRIC:  appropriate affect and demeanor; normal speech pattern; grossly normal memory;         Assessment:         F34.1   Dysthymic disorder       G25.81   Restless legs syndrome       I10   Essential (primary) hypertension       E78.00   Pure hypercholesterolemia       Z12.5   Encounter for screening for malignant neoplasm of prostate           ORDERS:         Meds Prescribed:       [Refilled] Lipitor 20 mg oral tablet [TAKE 1 TABLET AT BEDTIME], #90 (ninety) tablets, Refills: 3 (three)       [Refilled] lisinopriL 10 mg oral tablet [TAKE 1 TABLET DAILY], #90 (ninety) tablets, Refills: 3 (three)       [Refilled] Zoloft 50 mg oral tablet [TAKE 1 TABLET DAILY], #90 (ninety) tablets, Refills: 3 (three)       [Refilled] Mirapex ER 3 mg oral Tablet, Extended Release 24 hr [take 1 tablet (3 mg) by oral route once daily], #90 (ninety) tablets, Refills: 3 (three)       [New Rx] Mirapex ER 0.75 mg oral Tablet, Extended Release 24 hr [take 1 tablet (0.75  mg) by oral route once weekly PRN], #30 (thirty) tablets, Refills: 3 (three)         Radiology/Test Orders:       3017F  Colorectal CA screen results documented and reviewed (PV)  (In-House)              Lab Orders:       FUTURE  Future order to be done at patients convenience  (Send-Out)            82099  BDCB2 - [facility] CBC w/o diff  (Send-Out)            21340  COMP - [facility] Comp. Metabolic Panel  (Send-Out)            58467  TSH - [facility] TSH  (Send-Out)            94175  LPDP - [facility] Lipid Panel  (Send-Out)            *  [employer] Medicare screening PSA  (Send-Out)              Other Orders:       1101F  Pt screen for fall risk; document no falls in past year or only 1 fall w/o injury in past year (TARSHA)  (In-House)                      Plan:         Dysthymic disorder        MEDICATIONS: (no change to current medication regimen)     FOLLOW-UP: Schedule a follow-up visit in 6 months. [facility]     RECOMMENDATIONS given include: need for yearly flu shots.  MIPS Has had no falls or only one fall without injury in the past year Vaccines Flu and Pneumonia updated in Shot record Colorectal Cancer Screening is up to date and the results are in the chart Telehealth: Verbal consent obtained for visit to occur via televideo conferencing           Orders:       1101F  Pt screen for fall risk; document no falls in past year or only 1 fall w/o injury in past year (TARSHA)  (In-House)            3017F  Colorectal CA screen results documented and reviewed (PV)  (In-House)              Restless legs syndrome          Prescriptions:       [Refilled] Zoloft 50 mg oral tablet [TAKE 1 TABLET DAILY], #90 (ninety) tablets, Refills: 3 (three)       [Refilled] Mirapex ER 3 mg oral Tablet, Extended Release 24 hr [take 1 tablet (3 mg) by oral route once daily], #90 (ninety) tablets, Refills: 3 (three)       [New Rx] Mirapex ER 0.75 mg oral Tablet, Extended Release 24 hr [take 1 tablet (0.75 mg) by oral route once weekly PRN], #30 (thirty) tablets, Refills:  3 (three)         Essential (primary) hypertension        FOLLOW-UP TESTING #1: FOLLOW-UP LABORATORY:  Labs to be scheduled in the future include CBC without diff, CMP, and TSH.            Prescriptions:       [Refilled] lisinopriL 10 mg oral tablet [TAKE 1 TABLET DAILY], #90 (ninety) tablets, Refills: 3 (three)           Orders:       FUTURE  Future order to be done at patients convenience  (Send-Out)            44109  BDCB2 [facility] CBC w/o diff  (Send-Out)            61274  COMP - [facility] Comp. Metabolic Panel  (Send-Out)            54895  TSH - [facility] TSH  (Send-Out)              Pure hypercholesterolemia        FOLLOW-UP TESTING #1: FOLLOW-UP LABORATORY:  Labs to be scheduled in the future include lipid panel.            Prescriptions:       [Refilled] Lipitor 20 mg oral tablet [TAKE 1 TABLET AT BEDTIME], #90 (ninety) tablets, Refills: 3 (three)           Orders:       [id number]  [facility] - [facility] Lipid Panel  (Send-Out)              Encounter for screening for malignant neoplasm of prostate        FOLLOW-UP TESTING #1: FOLLOW-UP LABORATORY:  Labs to be scheduled in the future include PSA Screening Medicare patients.            Orders:       *  [employer] Medicare screening PSA  (Send-Out)                  Patient Recommendations:        For  Dysthymic disorder:    Schedule a follow-up visit in 6 months.  Obtain a flu shot each year.          For  Essential (primary) hypertension:            The following laboratory testing has been ordered: metabolic panel, comprehensive TSH         For  Pure hypercholesterolemia:            The following laboratory testing has been ordered: lipid panel         For  Encounter for screening for malignant neoplasm of prostate:            The following laboratory testing has been ordered:             Charge Capture:         Primary Diagnosis:     F34.1  Dysthymic disorder           Orders:      86534  Office/outpatient visit; established patient, level 3  (In-House)            1101F  Pt screen for  fall risk; document no falls in past year or only 1 fall w/o injury in past year (TARSHA)  (In-House)            3017F  Colorectal CA screen results documented and reviewed (PV)  (In-House)              G25.81  Restless legs syndrome     I10  Essential (primary) hypertension     E78.00  Pure hypercholesterolemia     Z12.5  Encounter for screening for malignant neoplasm of prostate

## 2021-05-18 NOTE — PROGRESS NOTES
William Turcios 1955     Office/Outpatient Visit    Visit Date: Tue, May 21, 2019 12:55 pm    Provider: Walt Giraldo MD (Assistant: Dominique Beard MA)    Location: [facility]        Electronically signed by Walt Giraldo MD on  05/21/2019 02:24:54 PM                             SUBJECTIVE:        CC: (MIRAPEX XR 3MG ONE P QD, CHANGED BY NEUROLOGIST PER PATIENT)     Mr. Turcios is a 64 year old White male.  This is a follow-up visit.  check up         HPI:         Patient complains of health checkup.  He cannot recall when he last had a physical exam.  He underwent colonoscopy 4 years ago.      Smoking Status:  Nonsmoker         PHQ-9 Depression Screening: Completed form scanned and in chart; Total Score 7 Alcohol Consumption Screening: Completed form scanned and in chart; Total Score 1     ROS:     CONSTITUTIONAL:  Negative for chills and fever.      EYES:  Negative for blurred vision and eye drainage.      E/N/T:  Negative for ear pain, diminished hearing, nasal congestion and sore throat.      CARDIOVASCULAR:  Negative for chest pain, palpitations and pedal edema.      RESPIRATORY:  Negative for recent cough and dyspnea.      GASTROINTESTINAL:  Negative for abdominal pain, anorexia, constipation, diarrhea, nausea and vomiting.      GENITOURINARY:  Negative for dysuria and hematuria.      MUSCULOSKELETAL:  Negative for myalgias.      NEUROLOGICAL:  Negative for headaches.      PSYCHIATRIC:  Positive for depression ( (MEDS WORKING WELL) ).          [facility]/:     Last Reviewed on 5/21/2019 01:15 PM by Walt Giraldo    Past Medical History:             PAST MEDICAL HISTORY             CURRENT MEDICAL PROVIDERS:    Dermatologist    Neurologist         PREVENTIVE HEALTH MAINTENANCE             COLORECTAL CANCER SCREENING: Up to date (colonoscopy q10y; sigmoidoscopy q5y; Cologuard q3y) was last done 2015, Results are in chart; colonoscopy with normal results     DENTAL CLEANING:  was last done 2019     EYE EXAM: was last done 2019     PSA: was last done 2017 with normal results         Surgical History:         Positive for    Laminectomy: lumbar region; ;     Positive for    Treadmill stress test ( 2012, 2015--NEG;; );         Family History:         Positive for Coronary Artery Disease and Hypertension.          Social History:     Occupation: Retired (Prior occupation: Dentist)     Marital Status:          Tobacco/Alcohol/Supplements:     Last Reviewed on 5/21/2019 01:14 PM by Walt Giraldo    Tobacco: He has never smoked.  Non-drinker     Caffeine:  He admits to consuming caffeine via coffee ( 2 servings per day ).          Substance Abuse History:     Last Reviewed on 5/21/2019 01:14 PM by Walt Giraldo    NEGATIVE         Mental Health History:     Last Reviewed on 11/14/2018 08:40 AM by Riddhi Bower        Communicable Diseases (eg STDs):     Last Reviewed on 11/14/2018 08:40 AM by Riddhi Bower            Current Problems:     Last Reviewed on 5/21/2019 01:16 PM by Walt Giraldo    CTS (mild)     Generalized osteoarthritis, multiple sites     Obstructive sleep apnea     HTN     High cholesterol (declines meds)     Depression responsive to treatment     Restless legs syndrome (RLS)     Screening test for viral diseases - other     Screening for depression     Screening for prostate cancer         Immunizations:     Fluzone (3 + years dose) 10/1/2017     Fluzone Quadrivalent (3+ years) 11/5/2018         Allergies:     Last Reviewed on 5/21/2019 01:14 PM by Walt Giraldo      No Known Drug Allergies.         Current Medications:     Last Reviewed on 5/21/2019 01:16 PM by Walt Giraldo    Zoloft 50mg Tablet one a day     Lisinopril 10mg Tablet 1 tab daily     MOUTHPIECE FOR LOTTIE     Mirapex ER 3mg Tablets, Extended Release Take 1 tablet(s) by mouth daily         OBJECTIVE:        Vitals:         Current: 5/21/2019 1:01:07  PM    Ht:  5 ft, 7.5 in;  Wt: 209.2 lbs;  BMI: 32.3    T: 98.6 F (oral);  BP: 146/77 mm Hg (left arm, sitting);  P: 72 bpm (left arm (BP Cuff), sitting);  sCr: 0.99 mg/dL;  GFR: 75.98        Exams:     PHYSICAL EXAM:     GENERAL: Vitals recorded well developed, well nourished;  well groomed;  no apparent distress;     EYES: conjunctiva and cornea are normal;     E/N/T:  normal EACs, TMs, nasal/oral mucosa, teeth, gingiva, and oropharynx;     NECK: trachea is midline; thyroid is non-palpable;     RESPIRATORY: normal respiratory rate and pattern with no distress; normal breath sounds with no rales, rhonchi, wheezes or rubs;     CARDIOVASCULAR: normal rate; rhythm is regular;  no systolic murmur; no edema;     GASTROINTESTINAL: nontender, nondistended; no hepatosplenomegaly or masses; no bruits; no abdominal hernias; rectal exam: normal tone; no masses; no hemorrhoids;     GENITOURINARY: no testicular tenderness or masses; no inguinal hernia; prostate:  no nodules, tenderness, or enlargement;     LYMPHATIC: no enlargement of cervical or facial nodes;     MUSCULOSKELETAL: normal gait; normal overall tone     NEUROLOGIC: GROSSLY INTACT     PSYCHIATRIC:  appropriate affect and demeanor; normal speech pattern; grossly normal memory;         ASSESSMENT           V70.0   Z00.00  Health checkup              DDx:     V76.44   Z12.5  Screening for prostate cancer              DDx:     401.1   I10  HTN              DDx:     272.0   E78.00  High cholesterol (declines meds)              DDx:     V73.89   Z11.59  Screening test for viral diseases - other              DDx:     V79.0   Z13.89  Screening for depression              DDx:         ORDERS:         Meds Prescribed:       Refill of: Zoloft (Sertraline HCl) 50mg Tablet one a day  #90 (Ninety) tablet(s) Refills: 1       Refill of: Lisinopril 10mg Tablet 1 tab daily  #90 (Ninety) tablet(s) Refills: 1       Refill of: Mirapex ER (Pramipexole) 3mg Tablets, Extended Release Take  1 tablet(s) by mouth daily  #90 (Ninety) tablet(s) Refills: 1         Radiology/Test Orders:       3017F  Colorectal CA screen results documented and reviewed (PV)  (In-House)           Lab Orders:       *  PRSAS Medicare screening PSA  (Send-Out)         63808  BDCBC - [facility] CBC with 3 part diff  (Send-Out)         83010  COMP - [facility] Comp. Metabolic Panel  (Send-Out)         48593  LPDP [facility] Lipid Panel  (Send-Out)         75000  RIBBA - [facility] Hepatitis C antibody with reflex  (Send-Out)           Other Orders:         Negative EtOH screen  (In-House)           Calculated BMI above the upper parameter and a follow-up plan was documented in the medical record  (In-House)           Depression screen positive and follow up plan documented  (In-House)                   PLAN:          Health checkup         COUNSELING was provided today regarding the following topics: healthy eating habits, regular exercise, use of seat belts, and ADVISED TO SEE AN EYE DOCTOR AND A DENTIST REGULARLY.      FOLLOW-UP: Schedule a follow-up visit in 6 months.  MIPS Positive Depression Screen: STABLE ON CURRENT MEDS Negative alcohol screen     COLORECTAL CANCER SCREENING: Results are in chart     BMI Elevated - Follow-Up Plan: He was provided education on weight loss strategies           Orders:         Negative EtOH screen  (In-House)         3017F  Colorectal CA screen results documented and reviewed (PV)  (In-House)           Calculated BMI above the upper parameter and a follow-up plan was documented in the medical record  (In-House)           Depression screen positive and follow up plan documented  (In-House)            Screening for prostate cancer     LABORATORY:  Labs ordered to be performed today include PSA.            Orders:       *  PRSAS Medicare screening PSA  (Send-Out)            HTN     LABORATORY:  Labs ordered to be performed today include CBC, Comprehensive metabolic panel, and lipid  panel.            Prescriptions:       Refill of: Lisinopril 10mg Tablet 1 tab daily  #90 (Ninety) tablet(s) Refills: 1           Orders:       [id number]  BDCBC [facility] CBC with 3 part diff  (Send-Out)         34087  COMP [facility] Comp. Metabolic Panel  (Send-Out)         44161  LPDP [facility] Lipid Panel  (Send-Out)            High cholesterol (declines meds)         RECOMMENDATIONS given include: low cholesterol/low fat diet.           Screening test for viral diseases - other     LABORATORY:  Labs ordered to be performed today include Hepatitis C antibody with reflex.            Orders:       03184  RIBBA [facility] Hepatitis C antibody with reflex  (Send-Out)               Other Prescriptions:       Refill of: Mirapex ER (Pramipexole) 3mg Tablets, Extended Release Take 1 tablet(s) by mouth daily  #90 (Ninety) tablet(s) Refills: 1       Refill of: Zoloft (Sertraline HCl) 50mg Tablet one a day  #90 (Ninety) tablet(s) Refills: 1         Patient Recommendations:        For  Health checkup:     Limit dietary intake of fat (especially saturated fat) and cholesterol.  Eat a variety of foods, including plenty of fruits, vegetables, and grain containg fiber, limit fat intake to 30% of total calories. Balance caloric intake with energy expended. Maintaining regular physical activity is advised to help prevent heart disease, hypertension, diabetes, and obesity. Always use shoulder/lap restraints when driving or riding in a vehicle, even those equipped with air bags.  Schedule a follow-up visit in 6 months.          For  High cholesterol (declines meds):     Reduce the amount of cholesterol and saturated fat in your diet.              CHARGE CAPTURE           **Please note: ICD descriptions below are intended for billing purposes only and may not represent clinical diagnoses**        Primary Diagnosis:         V70.0 Health checkup            Z00.00    Encounter for general adult medical examination without abnormal findings               Orders:          26782   Preventive medicine, established patient, age 40-64 years  (In-House)                Negative EtOH screen  (In-House)             3017F   Colorectal CA screen results documented and reviewed (PV)  (In-House)                Calculated BMI above the upper parameter and a follow-up plan was documented in the medical record  (In-House)                Depression screen positive and follow up plan documented  (In-House)           V76.44 Screening for prostate cancer            Z12.5    Encounter for screening for malignant neoplasm of prostate    401.1 HTN            I10    Essential (primary) hypertension    272.0 High cholesterol (declines meds)            E78.00    Pure hypercholesterolemia, unspecified    V73.89 Screening test for viral diseases - other            Z11.59    Encounter for screening for other viral diseases    V79.0 Screening for depression            Z13.89    Encounter for screening for other disorder

## 2021-05-18 NOTE — PROGRESS NOTES
William Turcios  1955     Office/Outpatient Visit    Visit Date: Wed, Mar 25, 2020 09:49 am    Provider: Chivo Giraldo MD (Assistant: Dominique Beard MA)    Location: [facility]        Electronically signed by Chivo Giraldo MD on  03/25/2020 03:25:39 PM                             Subjective:        CC: NOT TAKING MIRAPEX Mr. Turcios is a 64 year old White male.  This is a follow-up visit.  check up, discuss restless leg mediation.  PRESENT: CHIVO WEEMS MD         HPI:           Complaint of restless legs syndrome..  The symptom began years ago.  The severity is of moderate intensity.  NEG EMG, NEURO VERIFIED DX.  HIGH DOSE MIRAPEX HELPS BUT IS CAUSING HEADACHES.  NEURONTIN DID NOT HELP           In regard to the essential (primary) hypertension, his current cardiac medication regimen includes an ACE inhibitor.  Compliance with treatment has been good.  He is tolerating the medication well without side effects.  He has not kept a blood pressure diary, but states that pressures have been well controlled.            Pure hypercholesterolemia details; current treatment includes a lipid lowering agent.  Compliance with treatment has been good.  Most recent lab tests include Total Cholesterol:  182 (mg/dL) (09/09/2019), HDL:  56 (mg/dL) (09/09/2019), Triglycerides:  141 (mg/dL) (09/09/2019), LDL:  98 (mg/dL) (09/09/2019) and BEFORE MEDS.      ROS:     CONSTITUTIONAL:  Negative for chills and fever.      E/N/T:  Negative for ear pain, nasal congestion and sore throat.      CARDIOVASCULAR:  Negative for chest pain, palpitations and pedal edema.      RESPIRATORY:  Negative for recent cough and dyspnea.      GASTROINTESTINAL:  Negative for abdominal pain, nausea and vomiting.      NEUROLOGICAL:  Negative for headaches.      PSYCHIATRIC:  Positive for depression ( (MEDS WORKING WELL) ).          Past Medical History / Family History / Social History:         Last Reviewed  on 3/25/2020 10:12 AM by Walt Giraldo    Past Medical History:             PAST MEDICAL HISTORY             CURRENT MEDICAL PROVIDERS:    Dermatologist    Neurologist         PREVENTIVE HEALTH MAINTENANCE             COLORECTAL CANCER SCREENING: Up to date (colonoscopy q10y; sigmoidoscopy q5y; Cologuard q3y) was last done 2015, Results are in chart; colonoscopy with normal results     DENTAL CLEANING: was last done 2019     EYE EXAM: was last done 2019     Hepatitis C Medicare Screening: was last done 2019     PSA: was last done 2019 with normal results         Surgical History:         Positive for    Laminectomy: lumbar region; ;     Positive for    Treadmill stress test ( 2012, 2015--NEG;; );         Family History:         Positive for Coronary Artery Disease and Hypertension.          Social History:     Occupation: Retired (Prior occupation: Dentist)     Marital Status:          Tobacco/Alcohol/Supplements:     Last Reviewed on 3/25/2020 10:12 AM by Walt Giraldo    Tobacco: He has never smoked.  Non-drinker     Caffeine:  He admits to consuming caffeine via coffee ( 2 servings per day ).          Substance Abuse History:     Last Reviewed on 3/25/2020 10:12 AM by Walt Giraldo    NEGATIVE         Mental Health History:     Last Reviewed on 11/14/2018 08:40 AM by Riddhi Bower        Communicable Diseases (eg STDs):     Last Reviewed on 11/14/2018 08:40 AM by Riddhi Bower        Current Problems:     Last Reviewed on 3/25/2020 10:12 AM by Walt Giraldo    Dysthymic disorder    Restless legs syndrome    Pure hypercholesterolemia    Essential (primary) hypertension    Obstructive sleep apnea    Primary generalized (osteo)arthritis    History of actinic keratosis    History of basal cell carcinoma of skin of unspecified ear    Family history of malignant neoplasm of the colon    CTS (mild)        Immunizations:     influenza, injectable, quadrivalent,  preservative free 12/6/2019    Fluzone (3 + years dose) 10/1/2017    Fluzone Quadrivalent (3+ years) 11/5/2018        Allergies:     Last Reviewed on 3/25/2020 10:12 AM by Walt Giraldo    No Known Allergies.        Current Medications:     Last Reviewed on 3/25/2020 10:12 AM by Walt Giraldo    Lisinopril 10 mg oral tablet [1 tab daily]    Zoloft 50mg Tablet [one a day]    MOUTHPIECE FOR LOTTIE       Lipitor 20 mg oral tablet [TAKE 1 TABLET AT BEDTIME]        Objective:        Exams:     PHYSICAL EXAM:     GENERAL: Vitals recorded well developed, well nourished;  well groomed;  no apparent distress;     PSYCHIATRIC:  appropriate affect and demeanor; normal speech pattern; grossly normal memory;         Assessment:         G25.81   Restless legs syndrome       I10   Essential (primary) hypertension       E78.00   Pure hypercholesterolemia       Z12.5   Encounter for screening for malignant neoplasm of prostate           ORDERS:         Meds Prescribed:       [New Rx] rOPINIRole 1 mg oral tablet [take 1 tablet (1 mg) by oral route 1-3 hours before bedtime], #30 (thirty) tablets, Refills: 1 (one)       [Refilled] Lisinopril 10 mg oral tablet [1 tab daily], #90 (ninety) tablets, Refills: 1 (one)       [Refilled] Zoloft 50 mg oral tablet [one a day], #90 (ninety) tablets, Refills: 1 (one)       [Refilled] Lipitor 20 mg oral tablet [TAKE 1 TABLET AT BEDTIME], #90 (ninety) tablets, Refills: 0 (zero)         Lab Orders:       APPTO  Appointment need  (In-House)            FUTURE  Future order to be done at patients convenience  (Send-Out)            15968  BDCB2 - [facility] CBC w/o diff  (Send-Out)            90517  COMP - [facility] Comp. Metabolic Panel  (Send-Out)            16257  TSH - [facility] TSH  (Send-Out)            33863  LPDP [facility] Lipid Panel  (Send-Out)            *  PRSAS Medicare screening PSA  (Send-Out)                      Plan:         Restless legs syndrome        FOLLOW-UP: Schedule a follow-up  visit in 6 months.:.            Prescriptions:       [New Rx] rOPINIRole 1 mg oral tablet [take 1 tablet (1 mg) by oral route 1-3 hours before bedtime], #30 (thirty) tablets, Refills: 1 (one)           Orders:       APPTO  Appointment need  (In-House)              Essential (primary) hypertension        FOLLOW-UP TESTING #1: FOLLOW-UP LABORATORY:  Labs to be scheduled in the future include CBC without diff, CMP, and TSH.   Patient to schedule in 2 months.            Prescriptions:       [Refilled] Lisinopril 10 mg oral tablet [1 tab daily], #90 (ninety) tablets, Refills: 1 (one)           Orders:       FUTURE  Future order to be done at patients convenience  (Send-Out)            97518  BDCB2 [facility] CBC w/o diff  (Send-Out)            16043  COMP [facility] Comp. Metabolic Panel  (Send-Out)            36883  TSH [facility] TSH  (Send-Out)              Pure hypercholesterolemia        FOLLOW-UP TESTING #1: FOLLOW-UP LABORATORY:  Labs to be scheduled in the future include lipid panel.            Prescriptions:       [Refilled] Lipitor 20 mg oral tablet [TAKE 1 TABLET AT BEDTIME], #90 (ninety) tablets, Refills: 0 (zero)           Orders:       [id number]  [facility] Lipid Panel  (Send-Out)              Encounter for screening for malignant neoplasm of prostate        FOLLOW-UP TESTING #1: FOLLOW-UP LABORATORY:  Labs to be scheduled in the future include PSA.            Orders:       *  PRSAS Medicare screening PSA  (Send-Out)                  Other Prescriptions:       [Refilled] Zoloft 50 mg oral tablet [one a day], #90 (ninety) tablets, Refills: 1 (one)         Patient Recommendations:        For  Restless legs syndrome:    Schedule a follow-up visit in 6 months.                APPOINTMENT INFORMATION:        Monday Tuesday Wednesday Thursday Friday Saturday Sunday            Time:___________________AM  PM   Date:_____________________         For  Essential (primary) hypertension:            The following laboratory  testing has been ordered: metabolic panel, comprehensive TSH Schedule the above testing in 2 months.          For  Pure hypercholesterolemia:            The following laboratory testing has been ordered: lipid panel         For  Encounter for screening for malignant neoplasm of prostate:            The following laboratory testing has been ordered:             Charge Capture:         Primary Diagnosis:     G25.81  Restless legs syndrome           Orders:      33982  Office/outpatient visit; established patient, level 3  (In-House)            APPTO  Appointment need  (In-House)              I10  Essential (primary) hypertension     E78.00  Pure hypercholesterolemia     Z12.5  Encounter for screening for malignant neoplasm of prostate         ADDENDUMS:      ____________________________________    Addendum: 03/27/2020 03:56 PM Walt Guzman        ADD [id number]; REMOVE [id number]

## 2021-05-18 NOTE — PROGRESS NOTES
William Turcios  1955     Office/Outpatient Visit    Visit Date: Fri, Dec 6, 2019 08:32 am    Provider: Gloria Keane N.P. (Assistant: Lupe Lynch MA)    Location: [facility]        Electronically signed by Gloria Keane N.P. on  12/06/2019 08:51:00 AM                             Subjective:        CC: Mr. Turcios is a 64 year old White male.  presents today due to complaints of cough, SOB, drainage X 3 weeks         HPI:           Patient complains of acute upper respiratory infection, unspecified.  These have been present for the past 3 weeks.  The symptoms include chest congestion, cough, post nasal drainage and fatigue.  He denies fever or wheezing.  He has already tried to relieve the symptoms with O.T.C. cough medication.      ROS:     CONSTITUTIONAL:  Positive for fatigue.   Negative for fever.      EYES:  Negative for blurred vision and eye drainage.      E/N/T:  Positive for post nasal drip.   Negative for ear pain.      CARDIOVASCULAR:  Negative for chest pain and palpitations.      RESPIRATORY:  Positive for recent cough and dyspnea.   Negative for frequent wheezing.      PSYCHIATRIC:  Negative for depression and suicidal thoughts.          Past Medical History / Family History / Social History:         Last Reviewed on 9/09/2019 10:35 AM by Walt Giraldo    Past Medical History:             PAST MEDICAL HISTORY             CURRENT MEDICAL PROVIDERS:    Dermatologist    Neurologist         PREVENTIVE HEALTH MAINTENANCE             COLORECTAL CANCER SCREENING: Up to date (colonoscopy q10y; sigmoidoscopy q5y; Cologuard q3y) was last done 2015, Results are in chart; colonoscopy with normal results     DENTAL CLEANING: was last done 2019     EYE EXAM: was last done 2019     Hepatitis C Medicare Screening: was last done 2019     PSA: was last done 2019 with normal results         Surgical History:         Positive for    Laminectomy: lumbar region; ;     Positive for     Treadmill stress test ( 2012, 2015--NEG;; );         Family History:         Positive for Coronary Artery Disease and Hypertension.          Social History:     Occupation: Retired (Prior occupation: Dentist)     Marital Status:          Tobacco/Alcohol/Supplements:     Last Reviewed on 9/09/2019 10:34 AM by Walt Giraldo    Tobacco: He has never smoked.  Non-drinker     Caffeine:  He admits to consuming caffeine via coffee ( 2 servings per day ).          Substance Abuse History:     Last Reviewed on 9/09/2019 10:34 AM by Walt Giraldo    NEGATIVE         Mental Health History:     Last Reviewed on 11/14/2018 08:40 AM by Riddhi Bower        Communicable Diseases (eg STDs):     Last Reviewed on 11/14/2018 08:40 AM by Riddhi Bower        Current Problems:     Last Reviewed on 9/09/2019 10:48 AM by Walt Giraldo    Restless legs syndrome (RLS)    Pure hypercholesterolemia, unspecified    Dysthymic disorder    Restless legs syndrome    Essential (primary) hypertension    HTN    High cholesterol (declines meds)    Depression responsive to treatment    Obstructive sleep apnea    Generalized osteoarthritis, multiple sites    Primary generalized (osteo)arthritis    History of actinic keratosis    CTS (mild)        Immunizations:     Fluzone (3 + years dose) 10/1/2017    Fluzone Quadrivalent (3+ years) 11/5/2018        Allergies:     Last Reviewed on 9/09/2019 10:34 AM by Walt Girlado    No Known Allergies.        Current Medications:     Last Reviewed on 9/09/2019 10:48 AM by Walt Giraldo    Lisinopril 10mg Tablet [1 tab daily]    Zoloft 50mg Tablet [one a day]    MOUTHPIECE FOR LOTTIE      Mirapex ER 3mg Tablets, Extended Release [Take 1 tablet(s) by mouth daily]    Lipitor 20mg Tablet [One PO Q HS]        Objective:        Vitals:         Historical:     9/9/2019  BP:   147/75 mm Hg ( (left arm, , sitting, );) 9/9/2019  P:   63bpm ( (left arm (BP Cuff), ,  sitting, );)     Current: 12/6/2019 8:36:25 AM    Ht:  5 ft, 7.5 in;  Wt: 215.2 lbs;  BMI: 33.2T: 97.7 F (oral);  BP: 135/79 mm Hg (left arm, sitting);  P: 69 bpm (left arm (BP Cuff), sitting);  sCr: 0.86 mg/dL;  GFR: 88.53O2 Sat: 96 % (room air)        Exams:     PHYSICAL EXAM:     GENERAL: well developed, well nourished;  no apparent distress;     EYES: PERRL, EOMI     E/N/T: EARS: external auditory canal normal;  both TMs are dull;  NOSE: normal turbinates; no sinus tenderness; OROPHARYNX: oral mucosa is normal; posterior pharynx shows no exudate and post nasal drip;     NECK: range of motion is normal; trachea is midline;     RESPIRATORY: normal appearance and symmetric expansion of chest wall; normal respiratory rate and pattern with no distress; rhonchi heard in the bases;     CARDIOVASCULAR: normal rate; rhythm is regular;     MUSCULOSKELETAL: normal gait;     NEUROLOGIC: mental status: alert and oriented x 3; GROSSLY INTACT     PSYCHIATRIC: appropriate affect and demeanor;         Assessment:         J18   Pneumonia, unspecified organism           ORDERS:         Meds Prescribed:       [New Rx] doxycycline hyclate 100 mg oral capsule [take 1 capsule (100 mg) by oral route every 12 hours], #20 (twenty) capsules, Refills: 0 (zero)       [New Rx] Tessalon Perles 100 mg oral capsule [take 1 -2 capsules by oral route every 8 hours as needed], #40 (forty) capsules, Refills: 0 (zero)         Other Orders:       [id number]  Noninvasive ear or pulse oximetry for oxygen saturation; single determination  (In-House)                      Plan:         Pneumonia, unspecified organismWill treat for PNA based on symptoms and exam findings. Follow up for persistent or worsening symptoms. Consider chest xray if not improving.         RECOMMENDATIONS given include: Push Fluids, Rest, Follow up if no improvement or worsening symptoms like high fevers, vomiting, weakness, or increasing shortness of air.    .      FOLLOW-UP: Chronic  visit follow up           Prescriptions:       [New Rx] doxycycline hyclate 100 mg oral capsule [take 1 capsule (100 mg) by oral route every 12 hours], #20 (twenty) capsules, Refills: 0 (zero)       [New Rx] Tessalon Perles 100 mg oral capsule [take 1 -2 capsules by oral route every 8 hours as needed], #40 (forty) capsules, Refills: 0 (zero)           Orders:       [id number]  Noninvasive ear or pulse oximetry for oxygen saturation; single determination  (In-House)                  Charge Capture:         Primary Diagnosis:     J18  Pneumonia, unspecified organism           Orders:      [id number]  Office/outpatient visit; established patient, level 3  (In-House)            [id number]  Noninvasive ear or pulse oximetry for oxygen saturation; single determination  (In-House)

## 2021-05-18 NOTE — PROGRESS NOTES
William Turcios 1955     Office/Outpatient Visit    Visit Date: Wed, Jan 31, 2018 09:14 am    Provider: Walt Giraldo MD (Assistant: Yessi Juarez RN)    Location: [facility]        Electronically signed by Walt Giraldo MD on  01/31/2018 01:43:58 PM                             SUBJECTIVE:        CC:     Mr. Turcios is a 62 year old White male.  Medication Refills         HPI:         Patient to be evaluated for hTN.  His current cardiac medication regimen includes an ACE inhibitor.  He has not kept a blood pressure diary, but states that pressures have been well controlled.  Compliance with treatment has been good.          With regard to the depression responsive to treatment, the diagnosis of depression was made several years ago.  Current medications include an antidepressant.  DOING WELL ON CURRENT MEDS         Complaint of restless legs syndrome (RLS)..  The symptom began years ago.  WOULD LIKE TO INCREASE DOSE OF M0ED AGAIN AND TO CONSIDER OTHER TREATMENTS         In regard to the high cholesterol (declines meds), current treatment includes diet.  Compliance with treatment has been fair.  Most recent lab tests include Total Cholesterol:  262 (mg/dL) (05/23/2017), HDL:  46 (mg/dL) (05/23/2017), Triglycerides:  111 (mg/dL) (05/23/2017), LDL:  194 (mg/dL) (05/23/2017).  STOPPED MEDS DUE TO CONCERNS     ROS:     CONSTITUTIONAL:  Negative for chills and fever.      E/N/T:  Negative for ear pain, nasal congestion and sore throat.      CARDIOVASCULAR:  Negative for chest pain, palpitations and pedal edema.      RESPIRATORY:  Negative for recent cough and dyspnea.      GASTROINTESTINAL:  Negative for abdominal pain, nausea and vomiting.      GENITOURINARY:  Negative for dysuria, hematuria, impotence and nocturia.      MUSCULOSKELETAL:  Positive for arthralgias (OCC, R KNEE).   Negative for myalgias.      NEUROLOGICAL:  Negative for headaches and weakness.          PMH/FMH/SH:     Last Reviewed  on 1/31/2018 09:57 AM by Walt Giraldo    Past Medical History:             PAST MEDICAL HISTORY             CURRENT MEDICAL PROVIDERS:    Pulmonologist         Surgical History:         Positive for    Laminectomy: lumbar region; ;     Positive for    Colonoscopy ( 2005, 2015--NEG;; ) and    Treadmill stress test ( 2012, 2015--NEG;; );         Family History:         Positive for Coronary Artery Disease and Hypertension.          Social History:     Occupation: TEACHER;     Marital Status:          Tobacco/Alcohol/Supplements:     Last Reviewed on 1/31/2018 09:57 AM by Walt Giraldo    Tobacco: He has never smoked.  Non-drinker     Caffeine:  He admits to consuming caffeine via coffee ( 2 servings per day ).          Substance Abuse History:     Last Reviewed on 1/31/2018 09:57 AM by Walt Giraldo    NEGATIVE             Current Problems:     Last Reviewed on 1/31/2018 09:58 AM by Walt Giraldo    Obstructive sleep apnea     HTN     High cholesterol (declines meds)     Depression responsive to treatment     Restless legs syndrome (RLS)         Immunizations:     Fluzone (3 + years dose) 10/1/2017         Allergies:     Last Reviewed on 1/31/2018 09:57 AM by Walt Giraldo      No Known Drug Allergies.         Current Medications:     Last Reviewed on 1/31/2018 09:57 AM by Walt Giraldo    Lisinopril 10mg Tablet 1 tab daily     Zoloft 50mg Tablet 1/2 a day     CPAP     Mirapex 0.5mg Tablet 4 po QHS         OBJECTIVE:        Vitals:         Current: 1/31/2018 9:15:49 AM    Ht:  5 ft, 7.5 in;  Wt: 199.4 lbs;  BMI: 30.8    T: 97.3 F (oral);  BP: 138/68 mm Hg (left arm, sitting);  P: 73 bpm (left arm (BP Cuff), sitting);  sCr: 1.07 mg/dL;  GFR: 70.64        Exams:     PHYSICAL EXAM:     GENERAL: Vitals recorded well developed, well nourished;  well groomed;  no apparent distress;     NECK: trachea is midline; thyroid is non-palpable;     RESPIRATORY:  normal respiratory rate and pattern with no distress; normal breath sounds with no rales, rhonchi, wheezes or rubs;     CARDIOVASCULAR: normal rate; rhythm is regular;  no systolic murmur; no edema;     GASTROINTESTINAL: nontender; no abdominal hernias;     LYMPHATIC: no enlargement of cervical or facial nodes;     NEUROLOGIC: GROSSLY INTACT     PSYCHIATRIC:  appropriate affect and demeanor; normal speech pattern; grossly normal memory;         ASSESSMENT           401.1   I10  HTN              DDx:     296.25   F34.1  Depression responsive to treatment              DDx:     272.0   E78.00  High cholesterol (declines meds)              DDx:     333.94   G25.81  Restless legs syndrome (RLS)              DDx:         ORDERS:         Meds Prescribed:       Refill of: Lisinopril 10mg Tablet 1 tab daily  #90 (Ninety) tablet(s) Refills: 1       Refill of: Mirapex (Pramipexole) 0.5mg Tablet 5 po QHS  #450 ([place] [address] [place]) tablet(s) Refills: 1       Refill of: Zoloft (Sertraline HCl) 50mg Tablet 1/2 a day  #45 ([place]) tablet(s) Refills: 1         Lab Orders:       FUTURE  Future order to be done at patients convenience  (Send-Out)         [id number]  [facility] CBC with 3 part diff  (Send-Out)         82103  TSH [facility] TSH  (Send-Out)         42995  COMP [facility] Comp. Metabolic Panel  (Send-Out)         75063  [facility] Lipid Panel  (Send-Out)           Procedures Ordered:       REFER  Referral to Specialist or Other Facility  (Send-Out)                   PLAN:          HTN         FOLLOW-UP: Schedule a follow-up visit in 6 months..      FOLLOW-UP TESTING #1: FOLLOW-UP LABORATORY:  Labs to be scheduled in the future include CBC and TSH.            Prescriptions:       Refill of: Lisinopril 10mg Tablet 1 tab daily  #90 (Ninety) tablet(s) Refills: 1           Orders:       FUTURE  Future order to be done at patients convenience  (Send-Out)         48556  [facility] CBC with 3 part diff  (Send-Out)         98611  TSH -  [facility] TSH  (Send-Out)            Depression responsive to treatment           Prescriptions:       Refill of: Zoloft (Sertraline HCl) 50mg Tablet 1/2 a day  #45 ([place]) tablet(s) Refills: 1          High cholesterol (declines meds)         FOLLOW-UP TESTING #1: FOLLOW-UP LABORATORY:  Labs to be scheduled in the future include CMP and lipid panel.            Orders:       [id number]  COMP - [facility] Comp. Metabolic Panel  (Send-Out)         26755  LPDP - [facility] Lipid Panel  (Send-Out)            Restless legs syndrome (RLS)         REFERRALS:  Referral initiated to a neurologist ( Dr. Melchor Shah [facility] ).            Prescriptions:       Refill of: Mirapex (Pramipexole) 0.5mg Tablet 5 po QHS  #450 ([address] [place] [address]) tablet(s) Refills: 1           Orders:       REFER  Referral to Specialist or Other Facility  (Send-Out)               Patient Recommendations:        For  HTN:     Schedule a follow-up visit in 6 months.                APPOINTMENT INFORMATION:        Monday Tuesday Wednesday Thursday Friday Saturday Sunday            Time:___________________AM  PM   Date:_____________________         The following laboratory testing has been ordered: CBC TSH         For  High cholesterol (declines meds):             The following laboratory testing has been ordered: metabolic panel, comprehensive lipid panel             CHARGE CAPTURE           **Please note: ICD descriptions below are intended for billing purposes only and may not represent clinical diagnoses**        Primary Diagnosis:         401.1 HTN            I10    Essential (primary) hypertension              Orders:          [id number]   Office/outpatient visit; established patient, level 4  (In-House)           296.25 Depression responsive to treatment            F34.1    Dysthymic disorder    272.0 High cholesterol (declines meds)            E78.00    Pure hypercholesterolemia, unspecified    333.94 Restless legs syndrome (RLS)            G25.81     Restless legs syndrome        ADDENDUMS:      ____________________________________    Date: 02/02/2018 10:51 AM    Author: Alejandra Cortez         Visit Note Faxed to:        Melchor Shah  (Neurology); Number [phone]     Health Summary Faxed to:        Melchor Shah  (Neurology); Number [phone]

## 2021-05-18 NOTE — PROGRESS NOTES
William Turcios 1955     Office/Outpatient Visit    Visit Date: Mon, Sep 9, 2019 10:25 am    Provider: Walt Giraldo MD (Assistant: Dominique Beard MA)    Location: [facility]        Electronically signed by Walt Giraldo MD on  09/09/2019 03:07:43 PM                             SUBJECTIVE:        CC:     Mr. Turcios is a 64 year old White male.  This is a follow-up visit.  check up         HPI:         Mr. Turcios presents with hTN.  He did not bring his blood pressure diary, but says that pressures have been okay.  He is tolerating the medication well without side effects.  Compliance with treatment has been good.          Concerning high cholesterol (declines meds), current treatment includes a lipid lowering agent.  Compliance with treatment has been good.  Most recent lab tests include Total Cholesterol:  245 (mg/dL) (05/23/2019), HDL:  50 (mg/dL) (05/23/2019), Triglycerides:  100 (mg/dL) (05/23/2019), LDL:  175 (mg/dL) (05/23/2019) and BEFORE MEDS.      ROS:     CONSTITUTIONAL:  Negative for chills and fever.      E/N/T:  Negative for ear pain, nasal congestion and sore throat.      CARDIOVASCULAR:  Negative for chest pain, palpitations and pedal edema.      RESPIRATORY:  Negative for recent cough and dyspnea.      GASTROINTESTINAL:  Negative for abdominal pain, nausea and vomiting.      MUSCULOSKELETAL:  Negative for myalgias.      NEUROLOGICAL:  Negative for headaches.      PSYCHIATRIC:  Positive for depression ( (MEDS WORKING WELL) ).          PM/FM/SH:     Last Reviewed on 9/09/2019 10:35 AM by Walt Giraldo    Past Medical History:             PAST MEDICAL HISTORY             CURRENT MEDICAL PROVIDERS:    Dermatologist    Neurologist         PREVENTIVE HEALTH MAINTENANCE             COLORECTAL CANCER SCREENING: Up to date (colonoscopy q10y; sigmoidoscopy q5y; Cologuard q3y) was last done 2015, Results are in chart; colonoscopy with normal results     DENTAL CLEANING:  was last done 2019     EYE EXAM: was last done 2019     Hepatitis C Medicare Screening: was last done 2019     PSA: was last done 2019 with normal results         Surgical History:         Positive for    Laminectomy: lumbar region; ;     Positive for    Treadmill stress test ( 2012, 2015--NEG;; );         Family History:         Positive for Coronary Artery Disease and Hypertension.          Social History:     Occupation: Retired (Prior occupation: Dentist)     Marital Status:          Tobacco/Alcohol/Supplements:     Last Reviewed on 9/09/2019 10:34 AM by Walt Giraldo    Tobacco: He has never smoked.  Non-drinker     Caffeine:  He admits to consuming caffeine via coffee ( 2 servings per day ).          Substance Abuse History:     Last Reviewed on 9/09/2019 10:34 AM by Walt Giraldo    NEGATIVE         Mental Health History:     Last Reviewed on 11/14/2018 08:40 AM by Riddhi Bower        Communicable Diseases (eg STDs):     Last Reviewed on 11/14/2018 08:40 AM by Riddhi Bower            Current Problems:     Last Reviewed on 9/09/2019 10:48 AM by Walt Giraldo    History of actinic keratosis     CTS (mild)     Generalized osteoarthritis, multiple sites     Obstructive sleep apnea     HTN     High cholesterol (declines meds)     Depression responsive to treatment     Restless legs syndrome (RLS)         Immunizations:     Fluzone (3 + years dose) 10/1/2017     Fluzone Quadrivalent (3+ years) 11/5/2018         Allergies:     Last Reviewed on 9/09/2019 10:34 AM by Walt Giraldo      No Known Drug Allergies.         Current Medications:     Last Reviewed on 9/09/2019 10:48 AM by Walt Giraldo    Lipitor 20mg Tablet One PO Q HS     Lisinopril 10mg Tablet 1 tab daily     Mirapex ER 3mg Tablets, Extended Release Take 1 tablet(s) by mouth daily     Zoloft 50mg Tablet one a day     MOUTHPIECE FOR LOTTIE         OBJECTIVE:        Vitals:         Current:  9/9/2019 10:30:02 AM    Ht:  5 ft, 7.5 in;  Wt: 206 lbs;  BMI: 31.8    T: 97.7 F (oral);  BP: 147/75 mm Hg (left arm, sitting);  P: 63 bpm (left arm (BP Cuff), sitting);  sCr: 1.01 mg/dL;  GFR: 73.99        Exams:     PHYSICAL EXAM:     GENERAL: Vitals recorded well developed, well nourished;  well groomed;  no apparent distress;     NECK: trachea is midline; thyroid is non-palpable;     RESPIRATORY: normal respiratory rate and pattern with no distress; normal breath sounds with no rales, rhonchi, wheezes or rubs;     CARDIOVASCULAR: normal rate; rhythm is regular;  no systolic murmur; no edema;     LYMPHATIC: no enlargement of cervical or facial nodes; no supraclavicular nodes;     NEUROLOGIC: GROSSLY INTACT     PSYCHIATRIC:  appropriate affect and demeanor; normal speech pattern; grossly normal memory;         ASSESSMENT           401.1   I10  HTN              DDx:     272.0   E78.00  High cholesterol (declines meds)              DDx:         ORDERS:         Meds Prescribed:       Refill of: Lisinopril 10mg Tablet 1 tab daily  #90 (Ninety) tablet(s) Refills: 1       Refill of: Mirapex ER (Pramipexole) 3mg Tablets, Extended Release Take 1 tablet(s) by mouth daily  #90 (Ninety) tablet(s) Refills: 1       Refill of: Zoloft (Sertraline HCl) 50mg Tablet one a day  #90 (Ninety) tablet(s) Refills: 1         Lab Orders:       [id number]  [facility] Comp. Metabolic Panel  (Send-Out)         [id number]  [facility] Lipid Panel  (Send-Out)         APPTO  Appointment need  (In-House)                   PLAN:          HTN         MEDICATIONS: (no change to current medication regimen)     RECOMMENDATIONS given include: perform routine monitoring of blood pressure with home blood pressure cuff.      RECOMMENDATIONS given include: need for yearly flu shots.      FOLLOW-UP: Schedule a follow-up visit in 6 months..   for Medicare Wellness Visit           Prescriptions:       Refill of: Lisinopril 10mg Tablet 1 tab daily  #90 (Ninety) tablet(s)  Refills: 1           Orders:       APPTO  Appointment need  (In-House)            High cholesterol (declines meds)     LABORATORY:  Labs ordered to be performed today include Comprehensive metabolic panel and lipid panel.            Orders:       [id number]  [facility] Comp. Metabolic Panel  (Send-Out)         [id number]  [facility] Lipid Panel  (Send-Out)               Other Prescriptions:       Refill of: Mirapex ER (Pramipexole) 3mg Tablets, Extended Release Take 1 tablet(s) by mouth daily  #90 (Ninety) tablet(s) Refills: 1       Refill of: Zoloft (Sertraline HCl) 50mg Tablet one a day  #90 (Ninety) tablet(s) Refills: 1         Patient Recommendations:        For  HTN:     Begin monitoring your blood pressure by brief nurse visits at our office, a home blood pressure monitor, or by checking on the machines in pharmacies or stores.  Keep a log of the readings.  Obtain a flu shot each year.  Schedule a follow-up visit in 6 months.                APPOINTMENT INFORMATION:        Monday Tuesday Wednesday Thursday Friday Saturday Sunday            Time:___________________AM  PM   Date:_____________________             CHARGE CAPTURE           **Please note: ICD descriptions below are intended for billing purposes only and may not represent clinical diagnoses**        Primary Diagnosis:         401.1 HTN            I10    Essential (primary) hypertension              Orders:          [id number]   Office/outpatient visit; established patient, level 3  (In-House)             APPTO   Appointment need  (In-House)           272.0 High cholesterol (declines meds)            E78.00    Pure hypercholesterolemia, unspecified

## 2021-07-01 VITALS
WEIGHT: 206 LBS | BODY MASS INDEX: 31.22 KG/M2 | DIASTOLIC BLOOD PRESSURE: 75 MMHG | SYSTOLIC BLOOD PRESSURE: 147 MMHG | HEART RATE: 63 BPM | HEIGHT: 68 IN | TEMPERATURE: 97.7 F

## 2021-07-01 VITALS
DIASTOLIC BLOOD PRESSURE: 80 MMHG | BODY MASS INDEX: 30.4 KG/M2 | HEART RATE: 62 BPM | TEMPERATURE: 98 F | HEIGHT: 68 IN | SYSTOLIC BLOOD PRESSURE: 143 MMHG | WEIGHT: 200.6 LBS

## 2021-07-01 VITALS
HEART RATE: 74 BPM | BODY MASS INDEX: 30.37 KG/M2 | SYSTOLIC BLOOD PRESSURE: 160 MMHG | WEIGHT: 200.4 LBS | DIASTOLIC BLOOD PRESSURE: 90 MMHG | TEMPERATURE: 96.9 F | HEIGHT: 68 IN

## 2021-07-01 VITALS
BODY MASS INDEX: 31.71 KG/M2 | TEMPERATURE: 98.6 F | HEART RATE: 72 BPM | SYSTOLIC BLOOD PRESSURE: 146 MMHG | WEIGHT: 209.2 LBS | HEIGHT: 68 IN | DIASTOLIC BLOOD PRESSURE: 77 MMHG

## 2021-07-01 VITALS
TEMPERATURE: 97.7 F | HEART RATE: 69 BPM | DIASTOLIC BLOOD PRESSURE: 79 MMHG | HEIGHT: 68 IN | OXYGEN SATURATION: 96 % | WEIGHT: 215.2 LBS | BODY MASS INDEX: 32.61 KG/M2 | SYSTOLIC BLOOD PRESSURE: 135 MMHG

## 2021-07-01 VITALS
SYSTOLIC BLOOD PRESSURE: 137 MMHG | HEART RATE: 70 BPM | HEIGHT: 68 IN | TEMPERATURE: 97.9 F | WEIGHT: 199 LBS | BODY MASS INDEX: 30.16 KG/M2 | DIASTOLIC BLOOD PRESSURE: 78 MMHG

## 2021-07-01 VITALS
HEIGHT: 68 IN | WEIGHT: 199.4 LBS | BODY MASS INDEX: 30.22 KG/M2 | HEART RATE: 73 BPM | SYSTOLIC BLOOD PRESSURE: 138 MMHG | TEMPERATURE: 97.3 F | DIASTOLIC BLOOD PRESSURE: 68 MMHG

## 2021-07-22 ENCOUNTER — OFFICE VISIT (OUTPATIENT)
Dept: FAMILY MEDICINE CLINIC | Age: 66
End: 2021-07-22

## 2021-07-22 VITALS
BODY MASS INDEX: 29.16 KG/M2 | HEART RATE: 66 BPM | SYSTOLIC BLOOD PRESSURE: 151 MMHG | WEIGHT: 192.4 LBS | DIASTOLIC BLOOD PRESSURE: 88 MMHG | HEIGHT: 68 IN

## 2021-07-22 DIAGNOSIS — I10 ESSENTIAL HYPERTENSION: ICD-10-CM

## 2021-07-22 DIAGNOSIS — G25.81 RESTLESS LEG SYNDROME: ICD-10-CM

## 2021-07-22 DIAGNOSIS — Z85.828 HISTORY OF BASAL CELL CARCINOMA: ICD-10-CM

## 2021-07-22 DIAGNOSIS — Z00.00 MEDICARE ANNUAL WELLNESS VISIT, SUBSEQUENT: ICD-10-CM

## 2021-07-22 DIAGNOSIS — Z12.5 ENCOUNTER FOR PROSTATE CANCER SCREENING: ICD-10-CM

## 2021-07-22 DIAGNOSIS — E78.00 PURE HYPERCHOLESTEROLEMIA: Primary | ICD-10-CM

## 2021-07-22 DIAGNOSIS — G47.33 OBSTRUCTIVE SLEEP APNEA OF ADULT: ICD-10-CM

## 2021-07-22 PROBLEM — Z12.11 SCREENING FOR COLON CANCER: Status: RESOLVED | Noted: 2021-07-22 | Resolved: 2021-07-22

## 2021-07-22 PROBLEM — F34.1 DYSTHYMIC DISORDER: Status: ACTIVE | Noted: 2021-07-22

## 2021-07-22 PROBLEM — Z12.11 SCREENING FOR COLON CANCER: Status: ACTIVE | Noted: 2021-07-22

## 2021-07-22 PROCEDURE — G0439 PPPS, SUBSEQ VISIT: HCPCS | Performed by: FAMILY MEDICINE

## 2021-07-22 PROCEDURE — 99213 OFFICE O/P EST LOW 20 MIN: CPT | Performed by: FAMILY MEDICINE

## 2021-07-22 RX ORDER — LISINOPRIL 10 MG/1
1 TABLET ORAL DAILY
COMMUNITY
Start: 2021-06-23 | End: 2022-02-08

## 2021-07-22 RX ORDER — PRAMIPEXOLE 3 MG/1
1 TABLET, EXTENDED RELEASE ORAL DAILY
COMMUNITY
Start: 2021-06-08 | End: 2021-09-07 | Stop reason: SDUPTHER

## 2021-07-22 RX ORDER — ATORVASTATIN CALCIUM 20 MG/1
1 TABLET, FILM COATED ORAL DAILY
COMMUNITY
Start: 2021-06-09 | End: 2022-02-08

## 2021-07-22 NOTE — PROGRESS NOTES
The ABCs of the Annual Wellness Visit  Subsequent Medicare Wellness Visit    Chief Complaint   Patient presents with   • Medicare Wellness-subsequent     Yearly MCW       Subjective   History of Present Illness:  William Turcios is a 66 y.o. male who presents for a Subsequent Medicare Wellness Visit.  F/U:  DEPRESSION, HTN, CHOL, RLS.  PSA-LABS, HAD R COL AT [facility]  NEEDS A REPEAT SLEEP STUDY AND REFERRAL TO DERM FOR F/U OF PRIOR BASAL CELL.  RLS IS DOING WELL ON CURRENT MEDS   HEALTH RISK ASSESSMENT    Recent Hospitalizations:  No hospitalization(s) within the last year.    Current Medical Providers:  Patient Care Team:  Walt Giraldo MD as PCP - General (Family Medicine)    Smoking Status:  Social History     Tobacco Use   Smoking Status Never Smoker   Smokeless Tobacco Never Used       Alcohol Consumption:  Social History     Substance and Sexual Activity   Alcohol Use Yes    Comment: rare       Depression Screen:   PHQ-2/PHQ-9 Depression Screening 7/22/2021   Little interest or pleasure in doing things 0   Feeling down, depressed, or hopeless 0   Total Score 0       Fall Risk Screen:  STEADI Fall Risk Assessment was completed, and patient is at LOW risk for falls.Assessment completed on:7/22/2021    Health Habits and Functional and Cognitive Screening:  Functional & Cognitive Status 7/22/2021   Do you have difficulty preparing food and eating? No   Do you have difficulty bathing yourself, getting dressed or grooming yourself? No   Do you have difficulty using the toilet? No   Do you have difficulty moving around from place to place? No   Do you have trouble with steps or getting out of a bed or a chair? No   Current Diet Well Balanced Diet   Dental Exam Not up to date   Eye Exam Up to date   Exercise (times per week) 7 times per week   Current Exercises Include Running/Jogging;Walking;Light Weights   Do you need help using the phone?  No   Are you deaf or do you have serious difficulty hearing?  Yes    Do you need help with transportation? No   Do you need help shopping? No   Do you need help preparing meals?  No   Do you need help with housework?  No   Do you need help with laundry? No   Do you need help taking your medications? No   Do you need help managing money? No   Do you ever drive or ride in a car without wearing a seat belt? No   Have you felt unusual stress, anger or loneliness in the last month? Yes   Who do you live with? Spouse   If you need help, do you have trouble finding someone available to you? No   Have you been bothered in the last four weeks by sexual problems? No   Do you have difficulty concentrating, remembering or making decisions? Yes         Does the patient have evidence of cognitive impairment? No    Asprin use counseling:Does not need ASA (and currently is not on it)    Age-appropriate Screening Schedule:  Refer to the list below for future screening recommendations based on patient's age, sex and/or medical conditions. Orders for these recommended tests are listed in the plan section. The patient has been provided with a written plan.    Health Maintenance   Topic Date Due   • TDAP/TD VACCINES (1 - Tdap) Never done   • ZOSTER VACCINE (1 of 2) Never done   • LIPID PANEL  07/22/2021   • INFLUENZA VACCINE  10/01/2021          The following portions of the patient's history were reviewed and updated as appropriate: allergies, current medications, past family history, past medical history, past social history, past surgical history and problem list.    Outpatient Medications Prior to Visit   Medication Sig Dispense Refill   • atorvastatin (LIPITOR) 20 MG tablet Take 1 tablet by mouth Daily.     • lisinopril (PRINIVIL,ZESTRIL) 10 MG tablet Take 1 tablet by mouth Daily.     • Pramipexole Dihydrochloride ER 3 MG tablet sustained-release 24 hour Take 1 tablet by mouth Daily.     • sertraline (Zoloft) 50 MG tablet Zoloft 50 mg oral tablet take 1 tablet (50 mg) by oral route once daily    "Active       No facility-administered medications prior to visit.       Patient Active Problem List   Diagnosis   • Essential hypertension   • Pure hypercholesterolemia   • Dysthymic disorder   • Restless leg syndrome   • Encounter for prostate cancer screening   • Obstructive sleep apnea of adult   • History of basal cell carcinoma   • Medicare annual wellness visit, subsequent       Advanced Care Planning:  ACP discussion was held with the patient during this visit. Patient has an advance directive in EMR which is still valid.     Review of Systems   Constitutional: Negative for activity change, appetite change, chills, fatigue and fever.   HENT: Negative for congestion, ear pain, hearing loss, rhinorrhea and sore throat.    Eyes: Negative for visual disturbance.   Respiratory: Negative for cough and shortness of breath.    Cardiovascular: Negative for chest pain, palpitations and leg swelling.   Gastrointestinal: Negative for abdominal pain, nausea and vomiting.   Genitourinary: Negative for dysuria and hematuria.   Musculoskeletal: Negative for arthralgias and myalgias.   Neurological: Negative for headaches.   Psychiatric/Behavioral: Negative for hallucinations.       Compared to one year ago, the patient feels his physical health is worse.  Compared to one year ago, the patient feels his mental health is worse.    Reviewed chart for potential of high risk medication in the elderly: yes  Reviewed chart for potential of harmful drug interactions in the elderly:yes    Objective         Vitals:    07/22/21 1031   BP: 151/88   BP Location: Left arm   Patient Position: Sitting   Cuff Size: Adult   Pulse: 66   Weight: 87.3 kg (192 lb 6.4 oz)   Height: 172.7 cm (68\")   PainSc: 0-No pain       Body mass index is 29.25 kg/m².  Discussed the patient's BMI with him. The BMI is in the acceptable range.    Physical Exam  Vitals and nursing note reviewed.   Constitutional:       General: He is not in acute distress.     " Appearance: Normal appearance.   HENT:      Right Ear: Tympanic membrane normal.      Left Ear: Tympanic membrane normal.      Mouth/Throat:      Pharynx: Oropharynx is clear.   Eyes:      Conjunctiva/sclera: Conjunctivae normal.   Neck:      Vascular: No carotid bruit.   Cardiovascular:      Rate and Rhythm: Normal rate and regular rhythm.      Heart sounds: No murmur heard.     Pulmonary:      Effort: Pulmonary effort is normal.      Breath sounds: Normal breath sounds.   Abdominal:      General: Bowel sounds are normal.      Palpations: Abdomen is soft.      Tenderness: There is no abdominal tenderness.   Musculoskeletal:         General: No swelling.      Cervical back: Neck supple.      Right lower leg: No edema.      Left lower leg: No edema.   Lymphadenopathy:      Cervical: No cervical adenopathy.   Neurological:      General: No focal deficit present.      Mental Status: He is alert.      Cranial Nerves: No cranial nerve deficit.      Coordination: Coordination normal.      Gait: Gait normal.   Psychiatric:         Mood and Affect: Mood normal.         Behavior: Behavior normal.               Assessment/Plan   Medicare Risks and Personalized Health Plan  CMS Preventative Services Quick Reference  Abdominal Aortic Aneurysm Screening  Advance Directive Discussion  Alcohol Misuse  Breast Cancer/Mammogram Screening  Cardiovascular risk  Chronic Pain   Colon Cancer Screening  Dementia/Memory   Depression/Dysphoria  Diabetic Lab Screening   Fall Risk  Glaucoma Risk  Hearing Problem    The above risks/problems have been discussed with the patient.  Pertinent information has been shared with the patient in the After Visit Summary.  Follow up plans and orders are seen below in the Assessment/Plan Section.    Diagnoses and all orders for this visit:    1. Pure hypercholesterolemia (Primary)  -     Lipid Panel    2. Essential hypertension  -     CBC (No Diff)  -     Comprehensive Metabolic Panel  -     TSH    3.  Encounter for prostate cancer screening  -     PSA Screen    4. Restless leg syndrome    5. Obstructive sleep apnea of adult  -     Ambulatory Referral to Sleep Medicine    6. Medicare annual wellness visit, subsequent    7. History of basal cell carcinoma  -     Ambulatory Referral to Dermatology      Follow Up:  Return in about 6 months (around 1/22/2022) for Recheck.     An After Visit Summary and PPPS were given to the patient.

## 2021-09-07 RX ORDER — PRAMIPEXOLE 3 MG/1
1 TABLET, EXTENDED RELEASE ORAL DAILY
Qty: 90 TABLET | Refills: 3 | Status: SHIPPED | OUTPATIENT
Start: 2021-09-07 | End: 2022-09-02

## 2021-09-07 NOTE — TELEPHONE ENCOUNTER
Rx Refill Note  Requested Prescriptions     Pending Prescriptions Disp Refills   • Pramipexole Dihydrochloride ER 3 MG tablet sustained-release 24 hour [Pharmacy Med Name: PRAMIPEXOLE DIHYDRO ER TAB 30 3MG] 90 tablet 3     Sig: TAKE 1 TABLET DAILY      Last office visit with prescribing clinician: 7/22/2021      Next office visit with prescribing clinician: 1/24/2022          {TIP  Is Refill Pharmacy correct?YES  Melissa Haynes  09/07/21, 14:08 EDT

## 2021-10-28 ENCOUNTER — OFFICE VISIT (OUTPATIENT)
Dept: SLEEP MEDICINE | Facility: HOSPITAL | Age: 66
End: 2021-10-28

## 2021-10-28 VITALS
BODY MASS INDEX: 33.49 KG/M2 | OXYGEN SATURATION: 97 % | HEART RATE: 81 BPM | HEIGHT: 68 IN | WEIGHT: 221 LBS | TEMPERATURE: 94.4 F | DIASTOLIC BLOOD PRESSURE: 69 MMHG | SYSTOLIC BLOOD PRESSURE: 150 MMHG

## 2021-10-28 DIAGNOSIS — G47.30 SLEEP APNEA, UNSPECIFIED TYPE: Primary | ICD-10-CM

## 2021-10-28 DIAGNOSIS — G25.81 RESTLESS LEGS SYNDROME (RLS): ICD-10-CM

## 2021-10-28 DIAGNOSIS — F51.04 PSYCHOPHYSIOLOGICAL INSOMNIA: ICD-10-CM

## 2021-10-28 DIAGNOSIS — G47.8 NON-RESTORATIVE SLEEP: ICD-10-CM

## 2021-10-28 DIAGNOSIS — G47.10 HYPERSOMNIA: ICD-10-CM

## 2021-10-28 DIAGNOSIS — R79.9 ABNORMAL FINDING OF BLOOD CHEMISTRY, UNSPECIFIED: ICD-10-CM

## 2021-10-28 DIAGNOSIS — E66.9 OBESITY WITH SERIOUS COMORBIDITY, UNSPECIFIED CLASSIFICATION, UNSPECIFIED OBESITY TYPE: ICD-10-CM

## 2021-10-28 PROCEDURE — 99204 OFFICE O/P NEW MOD 45 MIN: CPT | Performed by: FAMILY MEDICINE

## 2021-10-28 PROCEDURE — G0463 HOSPITAL OUTPT CLINIC VISIT: HCPCS | Performed by: FAMILY MEDICINE

## 2021-10-28 NOTE — PROGRESS NOTES
[facility] New Patient[facility]Consultation       Reason for Consultation: LOTTIE      Patient Care Team:  Walt Giraldo MD as PCP - General (Family Medicine)  Raimundo Fofana MD as Consulting Physician (Sleep Medicine)      History of present illness:  Thank you for asking me to see your patient.  The patient is a 66 y.o. male with hypertension hyperlipidemia dysthymic disorder restless leg syndrome and obstructive sleep apnea presents today to get back into care.  Is on pramipexole 3 mg nightly for restless leg syndrome.  Was diagnosed with obstructive sleep apnea via sleep study several years ago.  Did not use CPAP for long and quit using; difficulty tolerate.  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  Weight changes since then as well.  Has tried ropinirole and gabapentin and transdermal patch medication for restless leg syndrome in the past.  Did not work or made symptoms worse.  Best control so far has been with pramipexole extended release 3 mg nightly.  However control does need to be better.    Sleep Schedule:  Bed time: Very variable  Sleep latency: 1 hour to several hours  Wake time: Very variable  Average hours slept: 5-6  Non-restorative sleep: Yes  Number of naps per day: No  Rotating shifts?:  No  Nocturia: 3-4 times a night  Electronics in bedroom: Yes    Excessive daytime sleepiness or drowsiness:Y  Any accidents at work due to sleepiness in the last 5 years:N  Any difficulty driving due to sleepiness or being drowsy: Y  Weight changed in the last 5 years:Y - GAINED 20 LBS    Snoring:Y  Witnessed apneas:Y  Have you ever awakened gasping for breath, coughing, choking or respiratory discomfort: Y  Morning headaches: Y  Awaken with a sore throat or dry mouth: SOMETIMES    Any reports of leg jerking at night: Y  Urge sensations: Y - WORSE AT NIGHT AND MOVEMENT HELPS  Does pain disrupt sleep: Y  Sweating during sleep: N  Teeth grinding: N    Any sudden  "episodes of sleep during the day: N  Sleep paralysis/hallucinations: N  Muscle weakness with laughing/anger: N  Nightmares: N  Sleep walking: N    Are you sleepy when you increase your sleep time: N  Do you sleep better away from your own bed: N    ESS: 6    Social History: Retired no tobacco use occasional alcohol use no drug use 1 coffee a day    Review of Systems:    A complete review of systems was done and all were negative with the exception of all negative    Allergies:  Patient has no known allergies.    Family History: LOTTIE no       Current Outpatient Medications:   •  atorvastatin (LIPITOR) 20 MG tablet, Take 1 tablet by mouth Daily., Disp: , Rfl:   •  lisinopril (PRINIVIL,ZESTRIL) 10 MG tablet, Take 1 tablet by mouth Daily., Disp: , Rfl:   •  Pramipexole Dihydrochloride ER 3 MG tablet sustained-release 24 hour, Take 1 tablet by mouth Daily., Disp: 90 tablet, Rfl: 3  •  sertraline (Zoloft) 50 MG tablet, Zoloft 50 mg oral tablet take 1 tablet (50 mg) by oral route once daily   Active, Disp: , Rfl:     Vital Signs:    Vitals:    10/28/21 0900   BP: 150/69   Pulse: 81   Temp: 94.4 °F (34.7 °C)   SpO2: 97%   Weight: 100 kg (221 lb)   Height: 172.7 cm (68\")      Body mass index is 33.6 kg/m².         Physical Exam:   General Alert and oriented. No acute distress noted   Pharynx/Throat Class IV Mallampati airway, large tongue, no evidence of redundant lateral pharyngeal tissue. No oral lesions. No thrush. Moist mucous membranes.   Head Normocephalic. Symmetrical. Atraumatic.    Nose No septal deviation. No drainage   Chest Wall Normal shape. Symmetric expansion with respiration. No tenderness.   Neck Trachea midline, no thyromegaly or adenopathy    Lungs Clear to auscultation bilaterally. No wheezes. No rhonchi. No rales. Respirations regular, even and unlabored.   Heart Regular rhythm and normal rate. Normal S1 and S2. No murmur   Abdomen Soft, non-tender and non-distended. Normal bowel sounds. No masses. "   Extremities Moves all extremities well. No edema   Psychiatric Normal mood and affect.       Impression:  1. Sleep apnea, unspecified type    2. Hypersomnia    3. Non-restorative sleep    4. Obesity with serious comorbidity, unspecified classification, unspecified obesity type    5. Restless legs syndrome (RLS)    6. Psychophysiological insomnia    7. Abnormal finding of blood chemistry, unspecified         Plan:    Good sleep hygiene measures should be maintained.  Weight loss would be beneficial in this patient who is obese with BMI 33.6.    I discussed the pathophysiology of obstructive sleep apnea with the patient.  We discussed the adverse outcomes associated with untreated sleep-disordered breathing.  We discussed treatment modalities of obstructive sleep apnea including CPAP device as well as oral mandibular advancement device. Sleep study will be scheduled to establish definitive diagnosis of sleep disorder breathing.  Weight loss will be strongly beneficial in order to reduce the severity of sleep-disordered breathing.  Patient has narrow oropharyngeal structure.  Caution during activities that require prolonged concentration is strongly advised.  Patient will be notified of sleep study results after sleep study is completed.  If sleep apnea is only mild,  oral mandibular advancement device may be one of the treatment options.  However if sleep apnea is moderately severe, CPAP treatment will be strongly encouraged.     Patient prefers oral mandibular device or considering inspire therapy.  Follow-up home sleep study to reassess severity of sleep apnea.    If ferritin less than 45 we will consider iron supplementation to help with restless leg treatment.    Thank you for allowing me to participate in your patient's care.    Raimundo Fofana MD  Sleep Medicine  10/28/21  10:28 EDT

## 2021-10-29 ENCOUNTER — TELEPHONE (OUTPATIENT)
Dept: FAMILY MEDICINE CLINIC | Age: 66
End: 2021-10-29

## 2021-10-29 DIAGNOSIS — F34.1 DYSTHYMIC DISORDER: Primary | ICD-10-CM

## 2021-10-29 NOTE — TELEPHONE ENCOUNTER
Rx Refill Note  Requested Prescriptions     Pending Prescriptions Disp Refills   • sertraline (Zoloft) 50 MG tablet 90 tablet 1     Sig: Take 1 tablet by mouth Daily.      Last office visit with prescribing clinician: 7/22/2021      Next office visit with prescribing clinician: 1/24/2022  Last fill: 07/22/21, #90      Sneha Muñoz LPN  10/29/21, 09:36 EDT

## 2021-11-01 ENCOUNTER — LAB (OUTPATIENT)
Dept: LAB | Facility: HOSPITAL | Age: 66
End: 2021-11-01

## 2021-11-01 LAB
ALBUMIN SERPL-MCNC: 4.4 G/DL (ref 3.5–5.2)
ALBUMIN/GLOB SERPL: 2 G/DL
ALP SERPL-CCNC: 67 U/L (ref 39–117)
ALT SERPL W P-5'-P-CCNC: 40 U/L (ref 1–41)
ANION GAP SERPL CALCULATED.3IONS-SCNC: 7.1 MMOL/L (ref 5–15)
AST SERPL-CCNC: 26 U/L (ref 1–40)
BILIRUB SERPL-MCNC: 0.3 MG/DL (ref 0–1.2)
BUN SERPL-MCNC: 21 MG/DL (ref 8–23)
BUN/CREAT SERPL: 21.2 (ref 7–25)
CALCIUM SPEC-SCNC: 9.2 MG/DL (ref 8.6–10.5)
CHLORIDE SERPL-SCNC: 107 MMOL/L (ref 98–107)
CHOLEST SERPL-MCNC: 171 MG/DL (ref 0–200)
CO2 SERPL-SCNC: 27.9 MMOL/L (ref 22–29)
CREAT SERPL-MCNC: 0.99 MG/DL (ref 0.76–1.27)
DEPRECATED RDW RBC AUTO: 43.7 FL (ref 37–54)
ERYTHROCYTE [DISTWIDTH] IN BLOOD BY AUTOMATED COUNT: 12.9 % (ref 12.3–15.4)
FERRITIN SERPL-MCNC: 102 NG/ML (ref 30–400)
GFR SERPL CREATININE-BSD FRML MDRD: 76 ML/MIN/1.73
GLOBULIN UR ELPH-MCNC: 2.2 GM/DL
GLUCOSE SERPL-MCNC: 100 MG/DL (ref 65–99)
HCT VFR BLD AUTO: 45.2 % (ref 37.5–51)
HDLC SERPL-MCNC: 46 MG/DL (ref 40–60)
HGB BLD-MCNC: 15.3 G/DL (ref 13–17.7)
LDLC SERPL CALC-MCNC: 107 MG/DL (ref 0–100)
LDLC/HDLC SERPL: 2.3 {RATIO}
MCH RBC QN AUTO: 30.9 PG (ref 26.6–33)
MCHC RBC AUTO-ENTMCNC: 33.8 G/DL (ref 31.5–35.7)
MCV RBC AUTO: 91.3 FL (ref 79–97)
PLATELET # BLD AUTO: 203 10*3/MM3 (ref 140–450)
PMV BLD AUTO: 10 FL (ref 6–12)
POTASSIUM SERPL-SCNC: 4.5 MMOL/L (ref 3.5–5.2)
PROT SERPL-MCNC: 6.6 G/DL (ref 6–8.5)
PSA SERPL-MCNC: 0.64 NG/ML (ref 0–4)
RBC # BLD AUTO: 4.95 10*6/MM3 (ref 4.14–5.8)
SODIUM SERPL-SCNC: 142 MMOL/L (ref 136–145)
TRIGL SERPL-MCNC: 95 MG/DL (ref 0–150)
TSH SERPL DL<=0.05 MIU/L-ACNC: 1.82 UIU/ML (ref 0.27–4.2)
VLDLC SERPL-MCNC: 18 MG/DL (ref 5–40)
WBC # BLD AUTO: 5.93 10*3/MM3 (ref 3.4–10.8)

## 2021-11-01 PROCEDURE — G0103 PSA SCREENING: HCPCS | Performed by: FAMILY MEDICINE

## 2021-11-01 PROCEDURE — 82728 ASSAY OF FERRITIN: CPT | Performed by: FAMILY MEDICINE

## 2021-11-01 PROCEDURE — 84443 ASSAY THYROID STIM HORMONE: CPT | Performed by: FAMILY MEDICINE

## 2021-11-01 PROCEDURE — 80061 LIPID PANEL: CPT | Performed by: FAMILY MEDICINE

## 2021-11-01 PROCEDURE — 85027 COMPLETE CBC AUTOMATED: CPT | Performed by: FAMILY MEDICINE

## 2021-11-01 PROCEDURE — 80053 COMPREHEN METABOLIC PANEL: CPT | Performed by: FAMILY MEDICINE

## 2021-11-01 PROCEDURE — 36415 COLL VENOUS BLD VENIPUNCTURE: CPT | Performed by: FAMILY MEDICINE

## 2021-11-19 ENCOUNTER — APPOINTMENT (OUTPATIENT)
Dept: SLEEP MEDICINE | Facility: HOSPITAL | Age: 66
End: 2021-11-19

## 2021-11-24 ENCOUNTER — APPOINTMENT (OUTPATIENT)
Dept: SLEEP MEDICINE | Facility: HOSPITAL | Age: 66
End: 2021-11-24

## 2021-12-01 ENCOUNTER — TELEPHONE (OUTPATIENT)
Dept: FAMILY MEDICINE CLINIC | Age: 66
End: 2021-12-01

## 2021-12-01 ENCOUNTER — NURSE TRIAGE (OUTPATIENT)
Dept: CALL CENTER | Facility: HOSPITAL | Age: 66
End: 2021-12-01

## 2021-12-01 NOTE — TELEPHONE ENCOUNTER
Wife is calling the [facility],  told her her wanted to commit suicide, wife is not with the . Called the  stated he is better, has an appointment with provider next week, does not want an earlier one. Offered a warm transfer, he says this is on going and he has worked it out; Recall the wife asked  Check on the  and if he is really having issues needs the ED. Attempted to call the office ,no answer  Reason for Disposition  • Depression symptoms (sadness, hopelessness, decreased energy) interfere with work or school    Additional Information  • Negative: Patient attempted suicide  • Negative: Patient is threatening suicide now  • Negative: Violent behavior, or threatening to physically hurt or kill someone  • Negative: [1] Patient is very confused (disoriented, slurred speech) AND [2] no other adult (e.g., friend or family member) available  • Negative: [1] Difficult to awaken or acting very confused (disoriented, slurred speech) AND [2] new-onset  • Negative: Sounds like a life-threatening emergency to the triager  • Negative: Depression is main symptom and is not threatening suicide  • Negative: [1] Depression symptoms (sadness, hopelessness, decreased energy) AND [2] unable to do any normal activities (e.g., self care, school, work; in comparison to baseline).  • Negative: Patient sounds very sick or weak to the triager  • Negative: [1] Patient is not threatening suicide now BUT [2] has a suicide PLAN (e.g., overdose, gunshot) and ACCESS (e.g., collecting pills, gun in house)  • Negative: [1] Patient is not threatening suicide now BUT [2] had SUICIDAL BEHAVIORS in past 3 months  • Negative: Sometimes has thoughts of suicide  • Negative: Requesting to talk with a counselor (mental health worker, psychiatrist, etc.)  • Negative: Patient is evasive or refuses to answer questions regarding intent to harm themselves  • Negative: Substance use (drug use) or misuse, known or suspected    Answer  "Assessment - Initial Assessment Questions  1. MAIN CONCERN: \"What happened that made you call today?\"  Feels will be  going  2. RISK OF HARM - SUICIDAL IDEATION:  \"Do you ever have thoughts of hurting or killing yourself?\"  (e.g., yes, no, no but preoccupation with thoughts about death)    - WISH TO BE DEAD:  \"Have you wished you were dead or wished you could go to sleep and not wake up?\"    - INTENT:  \"Have you had any thoughts of hurting or killing yourself?\" (e.g., yes, no, N/A) If Yes, ask: \"Are you having these thoughts about killing yourself right NOW?\"    - PLAN: \"Have you thought about how you might do this?\" \"Do you have a specific plan for how you would do this?\" (e.g., gun, knife, overdose, no plan, N/A)    - ACCESS: If yes to PLAN, \"Do you have access to no?\" (e.g., pills, gun in house, knife in kitchen)   none  3. RISK OF HARM - SUICIDE ATTEMPT: \"Have you tried to harm yourself recently?\" If Yes, ask: When was this?\"    none  4. RISK OF HARM - SUICIDAL BEHAVIOR: \"Have you ever done anything, started to do anything, or prepared to do anything to end your life?\" (e.g., collected pills, bought a gun, wrote a suicide note, cut yourself, started but changed your mind)   no  5. EVENTS AND STRESSORS: \"Has there been any new stress or recent changes in your life?\" (e.g., recent loss of loved one, negative event, homelessness)   none  6. FUNCTIONAL IMPAIRMENT: \"How have things been going for you overall? Have you had more difficulty than usual doing your normal daily activities?\"  (e.g., better, same, worse; self-care, school, work, interactions)   he is feeling better  7. SUPPORT: \"Who is with you now?\" \"Who do you live with?\" \"Do you have family or friends who you can talk to?\"    no one  8. THERAPIST: \"Do you have a counselor or therapist? Name?\"     yes  9. ALCOHOL USE OR SUBSTANCE USE (DRUG USE): \"Do you drink alcohol or use any illegal drugs?\"  no  10. OTHER: \"Do you have any other physical symptoms right " "now?\" (e.g., fever)        no  11. PREGNANCY or POSTPARTUM: \"Is there any chance you are pregnant?\" \"When was your last menstrual period?\" \"Were you recently pregnant?\" \"When did you give birth?\"        na    Protocols used: SUICIDE CONCERNS-ADULT-AH      "

## 2021-12-01 NOTE — TELEPHONE ENCOUNTER
Call to pt's wife who had called in stating pt was suicidal. Wife states pt told him today that he has been this way his whole life and he fights it, she states she did not know this until today. Asked wife regarding therapist, she is unaware of any knowledge about a therapist. Inf wife that pt declined a sooner appt and that he felt better. Wife states she wished he would get help. Inf pt he has the right to decline a sooner appt, however if he begins stating he wants to kill himself, she can call 911 or encourage him to be assessed at [facility] for inpatient aggressive therapy. Wife states she will keep a close eye on him and discuss going to [facility] with him and see if he will comply. She will call back if she needs anything.

## 2021-12-13 ENCOUNTER — OFFICE VISIT (OUTPATIENT)
Dept: FAMILY MEDICINE CLINIC | Age: 66
End: 2021-12-13

## 2021-12-13 VITALS
HEART RATE: 74 BPM | WEIGHT: 216.4 LBS | DIASTOLIC BLOOD PRESSURE: 89 MMHG | SYSTOLIC BLOOD PRESSURE: 161 MMHG | HEIGHT: 68 IN | OXYGEN SATURATION: 98 % | BODY MASS INDEX: 32.8 KG/M2

## 2021-12-13 DIAGNOSIS — I10 ESSENTIAL HYPERTENSION: ICD-10-CM

## 2021-12-13 DIAGNOSIS — E78.00 HIGH CHOLESTEROL: ICD-10-CM

## 2021-12-13 DIAGNOSIS — G47.33 OBSTRUCTIVE SLEEP APNEA OF ADULT: ICD-10-CM

## 2021-12-13 DIAGNOSIS — F34.1 DYSTHYMIC DISORDER: Primary | ICD-10-CM

## 2021-12-13 DIAGNOSIS — G25.81 RLS (RESTLESS LEGS SYNDROME): ICD-10-CM

## 2021-12-13 PROBLEM — Z00.00 WELCOME TO MEDICARE PREVENTIVE VISIT: Status: RESOLVED | Noted: 2021-07-22 | Resolved: 2021-12-13

## 2021-12-13 PROBLEM — Z12.5 ENCOUNTER FOR PROSTATE CANCER SCREENING: Status: RESOLVED | Noted: 2021-07-22 | Resolved: 2021-12-13

## 2021-12-13 PROCEDURE — 99214 OFFICE O/P EST MOD 30 MIN: CPT | Performed by: FAMILY MEDICINE

## 2021-12-13 NOTE — ASSESSMENT & PLAN NOTE
Hypertension is improving with treatment.  Continue current treatment regimen.  Weight loss.  Regular aerobic exercise.  Continue current medications.  Blood pressure will be reassessed in 3 months.

## 2021-12-13 NOTE — ASSESSMENT & PLAN NOTE
Patient's depression is single episode and is severe without psychosis. Their depression is currently active and the condition is worsening. This will be reassessed in 3 months. F/U as described:patient referred to Mental Health Specialist   WILL TRY TO EXPEDITE HIS PSYCHIATRY APPT.  HE IS ADVISED TO GO TO THE ER AND/OR CALL A CRISIS LINE IF HE FEELS WORSE IN THE MEANTIME.  .

## 2021-12-13 NOTE — PROGRESS NOTES
"The ABCs of the Annual Wellness Visit  Initial Medicare Wellness Visit    Chief Complaint   Patient presents with   • Medicare Wellness-Initial Visit   • Hypertension     Subjective   History of Present Illness:  William Turcios is a 66 y.o. male who presents for an Initial Medicare Wellness Visit.    The following portions of the patient's history were reviewed and   updated as appropriate: {history reviewed:20406::\"allergies\",\"current medications\",\"past family history\",\"past medical history\",\"past social history\",\"past surgical history\",\"problem list\"}.     Compared to one year ago, the patient feels his physical   health is {better worse same:34208}.    Compared to one year ago, the patient feels his mental   health is {better worse same:69485}.    Recent Hospitalizations:  {Hospital Admission Status in the last 365 days:88652}      Current Medical Providers:  Patient Care Team:  Walt Giraldo MD as PCP - General (Family Medicine)    Outpatient Medications Prior to Visit   Medication Sig Dispense Refill   • atorvastatin (LIPITOR) 20 MG tablet Take 1 tablet by mouth Daily.     • lisinopril (PRINIVIL,ZESTRIL) 10 MG tablet Take 1 tablet by mouth Daily.     • Pramipexole Dihydrochloride ER 3 MG tablet sustained-release 24 hour Take 1 tablet by mouth Daily. 90 tablet 3   • sertraline (Zoloft) 50 MG tablet Take 1 tablet by mouth Daily. 90 tablet 1     No facility-administered medications prior to visit.       No opioid medication identified on active medication list. I have reviewed chart for other potential  high risk medication/s and harmful drug interactions in the elderly.          Aspirin is not on active medication list.  {ASPIRIN NOT ON MEDICATION LIST INDICATED/NOT INDICATED:64722}.    Patient Active Problem List   Diagnosis   • Essential hypertension   • High cholesterol   • Dysthymic disorder   • RLS (restless legs syndrome)   • Obstructive sleep apnea (uses a mouthpiece)    • History of basal cell " "carcinoma     Advance Care Planning  Advance Directive is not on file.  {ACP Discussion, Advance Directive not in EMR:94289}    Review of Systems   Constitutional: Negative for activity change, appetite change, chills, fatigue and fever.   HENT: Negative for congestion, ear pain, hearing loss, rhinorrhea and sore throat.    Eyes: Negative for discharge and visual disturbance.   Respiratory: Negative for cough and shortness of breath.    Cardiovascular: Negative for chest pain, palpitations and leg swelling.   Gastrointestinal: Negative for abdominal pain, nausea and vomiting.   Genitourinary: Negative for dysuria and hematuria.   Musculoskeletal: Negative for arthralgias and myalgias.   Psychiatric/Behavioral: Negative for dysphoric mood.        Objective       Vitals:    12/13/21 1303   BP: 161/89   BP Location: Left arm   Patient Position: Sitting   Cuff Size: Adult   Pulse: 74   SpO2: 98%   Weight: 98.2 kg (216 lb 6.4 oz)   Height: 172.7 cm (67.99\")   PainSc: 0-No pain     BMI Readings from Last 1 Encounters:   12/13/21 32.91 kg/m²   BMI is above normal parameters. Recommendations include: {BMI Follow-up Overweight:40682}    Does the patient have evidence of cognitive impairment? {Yes/No:52574}    Physical Exam  Lab Results   Component Value Date    TRIG 95 11/01/2021    HDL 46 11/01/2021     (H) 11/01/2021    VLDL 18 11/01/2021          HEALTH RISK ASSESSMENT    Smoking Status:  Social History     Tobacco Use   Smoking Status Never Smoker   Smokeless Tobacco Never Used     Alcohol Consumption:  Social History     Substance and Sexual Activity   Alcohol Use Yes    Comment: rare     Fall Risk Screen:    BECKIADI Fall Risk Assessment was completed, and patient is at LOW risk for falls.Assessment completed on:7/22/2021    Depression Screen:   PHQ-2/PHQ-9 Depression Screening 12/13/2021   Little interest or pleasure in doing things 2   Feeling down, depressed, or hopeless 2   Trouble falling or staying asleep, " or sleeping too much 3   Feeling tired or having little energy 2   Poor appetite or overeating 0   Feeling bad about yourself - or that you are a failure or have let yourself or your family down 2   Trouble concentrating on things, such as reading the newspaper or watching television 3   Moving or speaking so slowly that other people could have noticed. Or the opposite - being so fidgety or restless that you have been moving around a lot more than usual 2   Thoughts that you would be better off dead, or of hurting yourself in some way 1   Total Score 17   If you checked off any problems, how difficult have these problems made it for you to do your work, take care of things at home, or get along with other people? Somewhat difficult       Health Habits and Functional and Cognitive Screening:  Functional & Cognitive Status 12/13/2021   Do you have difficulty preparing food and eating? No   Do you have difficulty bathing yourself, getting dressed or grooming yourself? No   Do you have difficulty using the toilet? No   Do you have difficulty moving around from place to place? No   Do you have trouble with steps or getting out of a bed or a chair? No   Current Diet Other        Current Diet Comment all over the place   Dental Exam Up to date   Eye Exam Up to date   Exercise (times per week) 6 times per week   Current Exercises Include Walking;Weightlifting   Do you need help using the phone?  No   Are you deaf or do you have serious difficulty hearing?  No   Do you need help with transportation? No   Do you need help shopping? No   Do you need help preparing meals?  No   Do you need help with housework?  No   Do you need help with laundry? No   Do you need help taking your medications? No   Do you need help managing money? No   Do you ever drive or ride in a car without wearing a seat belt? No   Have you felt unusual stress, anger or loneliness in the last month? -   Who do you live with? -   If you need help, do you  have trouble finding someone available to you? -   Have you been bothered in the last four weeks by sexual problems? -   Do you have difficulty concentrating, remembering or making decisions? -       Age-appropriate Screening Schedule:  Refer to the list below for future screening recommendations based on patient's age, sex and/or medical conditions. Orders for these recommended tests are listed in the plan section. The patient has been provided with a written plan.    Health Maintenance   Topic Date Due   • TDAP/TD VACCINES (1 - Tdap) Never done   • ZOSTER VACCINE (1 of 2) Never done   • LIPID PANEL  11/01/2022   • INFLUENZA VACCINE  Completed            Assessment/Plan   CMS Preventative Services Quick Reference  Risk Factors Identified During Encounter  {Medicare Wellness Risk Factors:52477}  The above risks/problems have been discussed with the patient.  Follow up actions/plans if indicated are seen below in the Assessment/Plan Section.  Pertinent information has been shared with the patient in the After Visit Summary.    Diagnoses and all orders for this visit:    1. Dysthymic disorder (Primary)  Assessment & Plan:  Patient's depression is single episode and is severe without psychosis. Their depression is currently active and the condition is worsening. This will be reassessed in 3 months. F/U as described:patient referred to Mental Health Specialist   WILL TRY TO EXPEDITE HIS PSYCHIATRY APPT.  HE IS ADVISED TO GO TO THE ER AND/OR CALL A CRISIS LINE IF HE FEELS WORSE IN THE MEANTIME.  .    Orders:  -     Ambulatory Referral to Psychiatry    2. Essential hypertension  Assessment & Plan:  Hypertension is improving with treatment.  Continue current treatment regimen.  Weight loss.  Regular aerobic exercise.  Continue current medications.  Blood pressure will be reassessed in 3 months.      3. High cholesterol  Assessment & Plan:  Lipid abnormalities are improving with treatment.  Nutritional counseling was  provided.  Lipids will be reassessed in 3 months.      4. RLS (restless legs syndrome)  Assessment & Plan:  IMPROVED WITH CURRENT TREATMENT, CONTINUE SAME, WILL REEVALUATE AT NEXT VISIT       5. Obstructive sleep apnea (uses a mouthpiece)   Assessment & Plan:  IMPROVED WITH CURRENT TREATMENT, CONTINUE SAME, WILL REEVALUATE AT NEXT VISIT         Follow Up:  Return in about 3 months (around 3/13/2022).     An After Visit Summary and PPPS were made available to the patient.    {Optional Chart Navigation Links Wrapup  Review (Popup)  Advance Care Planning  Labs  CC  Problem List  Visit Diagnosis  Medications  Result Review  Imaging  Health Maintenance  Quality  BestPractice  SmartSets  SnapShot  Encounters  Notes  Media  Procedures :23}    {Time Spent-Use for E/M Coding (Optional):89282}

## 2021-12-13 NOTE — PROGRESS NOTES
Chief Complaint  Medicare Wellness-Initial Visit and Hypertension    Subjective     {Problem List  Visit Diagnosis   Encounters  Notes  Medications  Labs  Result Review Imaging  Media :23}     William Turcios presents to [facility]  --TOLERATING BLOOD PRESSURE MEDICATION WITHOUT APPARENT SIDE EFFECTS  --LAST LIPIDS WERE OK, NO ISSUES WITH THE STATIN  --THR RLS IS DOING WELL ON THE CURRENT DOSE OF MEDS  --HIS LOTTIE WAS REEVALUATED RECENTLY, HE IS STILL USING THE MOUTHPIECE AND DOING WELL  --LONG HISTORY OF DEPRESSION WHICH HAS DONE WELL ON AN SSRI BUT MUCH WORSE RECENTLY DUE TO SOME ISSUES IN HIS PERSONAL LIFE ON WHICH HE DOES NOT CARE TO ELABORATE.  HE HAS FELT SUICIDAL AT TIMES BUT IS ABLE TO CONTRACT.  WE ARE WORKING ON GETTING HIM IN TO A PSYCHIATRIST          No Known Allergies     Health Maintenance Due   Topic Date Due   • TDAP/TD VACCINES (1 - Tdap) Never done   • ZOSTER VACCINE (1 of 2) Never done   • Pneumococcal Vaccine 65+ (1 of 1 - PPSV23) Never done   • HEPATITIS C SCREENING  Never done        Current Outpatient Medications on File Prior to Visit   Medication Sig   • atorvastatin (LIPITOR) 20 MG tablet Take 1 tablet by mouth Daily.   • lisinopril (PRINIVIL,ZESTRIL) 10 MG tablet Take 1 tablet by mouth Daily.   • Pramipexole Dihydrochloride ER 3 MG tablet sustained-release 24 hour Take 1 tablet by mouth Daily.   • sertraline (Zoloft) 50 MG tablet Take 1 tablet by mouth Daily.     No current facility-administered medications on file prior to visit.       Immunization History   Administered Date(s) Administered   • COVID-19 (PFIZER) 01/12/2021, 02/02/2021, 08/25/2021   • Influenza, Unspecified 10/19/2020       Review of Systems   Constitutional: Negative for activity change, appetite change, chills, fatigue and fever.   HENT: Negative for congestion, ear pain, rhinorrhea and sore throat.    Eyes: Negative for blurred vision and discharge.   Respiratory: Negative for  "cough and shortness of breath.    Cardiovascular: Negative for chest pain, palpitations and leg swelling.   Gastrointestinal: Negative for abdominal pain, constipation, diarrhea, nausea and vomiting.   Musculoskeletal: Negative for arthralgias and myalgias.   Neurological: Negative for headache.        Objective     /89 (BP Location: Left arm, Patient Position: Sitting, Cuff Size: Adult)   Pulse 74   Ht 172.7 cm (67.99\")   Wt 98.2 kg (216 lb 6.4 oz)   SpO2 98%   BMI 32.91 kg/m²       Physical Exam  Vitals and nursing note reviewed.   Constitutional:       General: He is not in acute distress.     Appearance: Normal appearance.   Cardiovascular:      Rate and Rhythm: Normal rate and regular rhythm.      Heart sounds: Normal heart sounds. No murmur heard.      Pulmonary:      Effort: Pulmonary effort is normal.      Breath sounds: Normal breath sounds.   Abdominal:      Palpations: Abdomen is soft.      Tenderness: There is no abdominal tenderness.   Musculoskeletal:      Cervical back: Neck supple.      Right lower leg: No edema.      Left lower leg: No edema.   Lymphadenopathy:      Cervical: No cervical adenopathy.   Neurological:      General: No focal deficit present.      Mental Status: He is alert.      Cranial Nerves: No cranial nerve deficit.      Coordination: Coordination normal.      Gait: Gait normal.   Psychiatric:         Mood and Affect: Mood normal.         Behavior: Behavior normal.         Result Review :{Labs  Result Review  Imaging  Med Tab  Media :23}     {The following data was reviewed by (Optional):32533}    {Ambulatory Labs (Optional):16255}    {Data reviewed (Optional):58735:::1}                Assessment and Plan {CC Problem List  Visit Diagnosis  ROS  Review (Popup)  [facility] Maintenance  Quality  BestPractice  Medications  SmartSets  SnapShot Encounters  Media :23}     Diagnoses and all orders for this visit:    1. Dysthymic disorder (Primary)  Assessment & " Plan:  Patient's depression is single episode and is severe without psychosis. Their depression is currently active and the condition is worsening. This will be reassessed in 3 months. F/U as described:patient referred to Mental Health Specialist   WILL TRY TO EXPEDITE HIS PSYCHIATRY APPT.  HE IS ADVISED TO GO TO THE ER AND/OR CALL A CRISIS LINE IF HE FEELS WORSE IN THE MEANTIME.  .    Orders:  -     Ambulatory Referral to Psychiatry    2. Essential hypertension  Assessment & Plan:  Hypertension is improving with treatment.  Continue current treatment regimen.  Weight loss.  Regular aerobic exercise.  Continue current medications.  Blood pressure will be reassessed in 3 months.      3. High cholesterol  Assessment & Plan:  Lipid abnormalities are improving with treatment.  Nutritional counseling was provided.  Lipids will be reassessed in 3 months.      4. RLS (restless legs syndrome)  Assessment & Plan:  IMPROVED WITH CURRENT TREATMENT, CONTINUE SAME, WILL REEVALUATE AT NEXT VISIT       5. Obstructive sleep apnea (uses a mouthpiece)   Assessment & Plan:  IMPROVED WITH CURRENT TREATMENT, CONTINUE SAME, WILL REEVALUATE AT NEXT VISIT         {Time Spent (Optional):75372}    Follow Up {Instructions Charge Capture  Follow-up Communications :23}    Return in about 3 months (around 3/13/2022).    Patient was given instructions and counseling regarding his condition or for health maintenance advice. Please see specific information pulled into the AVS if appropriate.

## 2021-12-14 NOTE — PROGRESS NOTES
Chief Complaint  Hypertension  WORSENING DEPRESSION  Subjective          William Turcios presents to [facility]  --TOLERATING BLOOD PRESSURE MEDICATION WITHOUT APPARENT SIDE EFFECTS  --LAST LIPIDS WERE OK, NO ISSUES WITH THE STATIN  --THE MIRAPEX IS CONTROLLING THE RLS FAIRLY WELL  --HE RECENTLY HAD HIS LOTTIE REEVALUATED.  IS STILL USING THE MOUTHPIECE  --HIS DEPRESSION HAS BEEN MUCH WORSE LATELY DUE TO SOME PERSONAL ISSUES ON WHICH HE DOES NOT CARE TO ELABORATE.  SUICIDE HAS CROSSED HIS MIND BUT HE HAS NO PLAN AND IS ABLE TO CONTRACT.  A PSYCHIATRY REFERRAL IS PENDING.          No Known Allergies     Health Maintenance Due   Topic Date Due   • TDAP/TD VACCINES (1 - Tdap) Never done   • ZOSTER VACCINE (1 of 2) Never done   • Pneumococcal Vaccine 65+ (1 of 1 - PPSV23) Never done   • HEPATITIS C SCREENING  Never done        Current Outpatient Medications on File Prior to Visit   Medication Sig   • atorvastatin (LIPITOR) 20 MG tablet Take 1 tablet by mouth Daily.   • lisinopril (PRINIVIL,ZESTRIL) 10 MG tablet Take 1 tablet by mouth Daily.   • Pramipexole Dihydrochloride ER 3 MG tablet sustained-release 24 hour Take 1 tablet by mouth Daily.   • sertraline (Zoloft) 50 MG tablet Take 1 tablet by mouth Daily.     No current facility-administered medications on file prior to visit.       Immunization History   Administered Date(s) Administered   • COVID-19 (PFIZER) 01/12/2021, 02/02/2021, 08/25/2021   • Influenza, Unspecified 10/19/2020       Review of Systems   Constitutional: Negative for activity change, appetite change, chills, fatigue and fever.   HENT: Negative for congestion, ear pain, rhinorrhea and sore throat.    Respiratory: Negative for cough and shortness of breath.    Cardiovascular: Negative for chest pain, palpitations and leg swelling.   Gastrointestinal: Negative for abdominal pain, constipation, diarrhea, nausea and vomiting.   Musculoskeletal: Negative for arthralgias and  "myalgias.   Neurological: Negative for headache.        Objective     /89 (BP Location: Left arm, Patient Position: Sitting, Cuff Size: Adult)   Pulse 74   Ht 172.7 cm (67.99\")   Wt 98.2 kg (216 lb 6.4 oz)   SpO2 98%   BMI 32.91 kg/m²       Physical Exam  Vitals and nursing note reviewed.   Constitutional:       General: He is not in acute distress.     Appearance: Normal appearance.   Cardiovascular:      Rate and Rhythm: Normal rate and regular rhythm.      Heart sounds: Normal heart sounds. No murmur heard.      Pulmonary:      Effort: Pulmonary effort is normal.      Breath sounds: Normal breath sounds.   Abdominal:      Palpations: Abdomen is soft.      Tenderness: There is no abdominal tenderness.   Musculoskeletal:      Cervical back: Neck supple.      Right lower leg: No edema.      Left lower leg: No edema.   Lymphadenopathy:      Cervical: No cervical adenopathy.   Neurological:      General: No focal deficit present.      Mental Status: He is alert.      Cranial Nerves: No cranial nerve deficit.      Coordination: Coordination normal.      Gait: Gait normal.   Psychiatric:         Mood and Affect: Mood normal.         Behavior: Behavior normal.         Result Review :                             Assessment and Plan      Diagnoses and all orders for this visit:    1. Dysthymic disorder (Primary)  Assessment & Plan:  Patient's depression is single episode and is severe without psychosis. Their depression is currently active and the condition is worsening. This will be reassessed in 3 months. F/U as described:patient referred to Mental Health Specialist   WILL TRY TO EXPEDITE HIS PSYCHIATRY APPT.  HE IS ADVISED TO GO TO THE ER AND/OR CALL A CRISIS LINE IF HE FEELS WORSE IN THE MEANTIME.  .    Orders:  -     Ambulatory Referral to Psychiatry    2. Essential hypertension  Assessment & Plan:  Hypertension is improving with treatment.  Continue current treatment regimen.  Weight loss.  Regular aerobic " exercise.  Continue current medications.  Blood pressure will be reassessed in 3 months.      3. High cholesterol  Assessment & Plan:  Lipid abnormalities are improving with treatment.  Nutritional counseling was provided.  Lipids will be reassessed in 3 months.      4. RLS (restless legs syndrome)  Assessment & Plan:  IMPROVED WITH CURRENT TREATMENT, CONTINUE SAME, WILL REEVALUATE AT NEXT VISIT       5. Obstructive sleep apnea (uses a mouthpiece)   Assessment & Plan:  IMPROVED WITH CURRENT TREATMENT, CONTINUE SAME, WILL REEVALUATE AT NEXT VISIT             Follow Up     Return in about 3 months (around 3/13/2022).    Patient was given instructions and counseling regarding his condition or for health maintenance advice. Please see specific information pulled into the AVS if appropriate.

## 2021-12-29 ENCOUNTER — OFFICE VISIT (OUTPATIENT)
Dept: PSYCHIATRY | Facility: CLINIC | Age: 66
End: 2021-12-29

## 2021-12-29 VITALS
WEIGHT: 221 LBS | BODY MASS INDEX: 33.49 KG/M2 | HEIGHT: 68 IN | SYSTOLIC BLOOD PRESSURE: 151 MMHG | DIASTOLIC BLOOD PRESSURE: 74 MMHG

## 2021-12-29 DIAGNOSIS — F33.2 SEVERE EPISODE OF RECURRENT MAJOR DEPRESSIVE DISORDER, WITHOUT PSYCHOTIC FEATURES (HCC): Primary | ICD-10-CM

## 2021-12-29 DIAGNOSIS — F41.1 GENERALIZED ANXIETY DISORDER: ICD-10-CM

## 2021-12-29 DIAGNOSIS — F41.8 INSOMNIA SECONDARY TO DEPRESSION WITH ANXIETY: ICD-10-CM

## 2021-12-29 DIAGNOSIS — F51.05 INSOMNIA SECONDARY TO DEPRESSION WITH ANXIETY: ICD-10-CM

## 2021-12-29 PROCEDURE — 90792 PSYCH DIAG EVAL W/MED SRVCS: CPT | Performed by: NURSE PRACTITIONER

## 2021-12-29 RX ORDER — BUPROPION HYDROCHLORIDE 150 MG/1
150 TABLET ORAL EVERY MORNING
Qty: 30 TABLET | Refills: 2 | Status: SHIPPED | OUTPATIENT
Start: 2021-12-29 | End: 2022-02-15 | Stop reason: SDUPTHER

## 2021-12-29 NOTE — PATIENT INSTRUCTIONS
"1.  Please return to clinic at your next scheduled visit.  Contact the clinic ([phone]) at least 24 hours prior in the event you need to cancel.  2.  Do no harm to yourself or others.    3.  Avoid alcohol and drugs.    4.  Take all medications as prescribed.  Please contact the clinic with any concerns. If you are in need of medication refills, please call the clinic at [phone].    5. Should you want to get in touch with your provider, ARAM Jefferson, please utilize [employer] or contact the office ([phone]), and staff will be able to page the provider on call directly.  6.  In the event you have personal crisis, contact the following crisis numbers: Suicide Prevention Hotline 1-162.960.5194; ELI Helpline 9-062-046-VKJP; [facility] Emergency Room [phone]; text HELLO to 654878; or 550.     SPECIFIC RECOMMENDATIONS:     1.      Medications discussed at this encounter:                   - Start Bupropion (Wellbutrin XL) 150 mg by mouth daily in the morning.      2.      Psychotherapy recommendations: declines     3.     Return to clinic: 3 weeks     [facility]  Patient Safety Plan       Patient Name: William Turcios       YOB: 1955    Step 1: Warning Signs (thoughts, images, mood, situation, behavior) that a crisis may be developin. Thoughts \" I would be better off dead\" or \"If I did this, then I would be better off\"; Talking about wanting to die or to kill themselves, talking about feeling hopeless or having no reason to live, talking about feeling trapped or in unbearable pain, talking about being a burden to others, increasing the use of alcohol or drugs, acting anxious or agitated; behaving recklessly, sleeping too little or too much, withdrawing or isolating themselves, extreme mood swings.  2. Behaviors, isolating, more arguments  3. Worsening mood, sadness, fatigue    Step 2: Internal coping strategies-things I can do to take my mind off " my problems without contacting another person (relaxation technique, physical activity):     1. mindfulness (deep breathing, progressive muscle relaxation, imagery, grounding & meditation)   2. cognitive behavioral strategies (reviewing cognitive distortions, negative self-talk/thought stopping and cognitive restructuring, through de-catastrophizing and examining options/alternatives)   3. Walk/hike, listen to music, engage in a hobby, exercise, crafts/projects, read a book, journal.     Step 3: People and social settings that provide distraction:     1. Name: none at this time Phone:          2. Place: outside on farm  3. Place: outdoors activity    Step 4: People who I can ask for help:       Step 5: Professionals or agencies I can contact during a crisis:    1. Clinician Name:  ARAM Jefferson     Phone: [phone] or [phone]        Clinician Pager or Emergency Contact #     2.   Clinician Name: PCP or therapist      Phone:        Clinician Pager or Emergency Contact #     3. Local Emergency Care Services: [facility]      Emergency Care Services Address: [address] [place] [address]      Emergency Care Services Phones: [phone]    4. 24/7 Suicide Prevention Lifeline Phone: [phone]    5. 24/7 Crisis Text Line Counseling: Text 'HOME' to 237170    Making the Environment Safe:     1.  Keep weapons out of home    The one thing that is most important to me and worth living for is: focus on life and enjoy family.       (From RUTH Hoyt & Mick G.K. 2011). Safety Planning Intervention: A brief intervention to mitigate suicide risk. Cognitive and Behavioral Practice. 19, [phone]    Patient has verbally agreed to Patient Safety Plan listed above.     This document has been electronically signed by ARAM Jefferson  December 29, 2021 11:21 EST  Bupropion extended-release tablets (Depression/Mood Disorders)  What is this medicine?  BUPROPION (byoo PROE  pee on) is used to treat depression.  This medicine may be used for other purposes; ask your health care provider or pharmacist if you have questions.  COMMON BRAND NAME(S): Aplenzin, Budeprion XL, Forfivo XL, Wellbutrin XL  What should I tell my health care provider before I take this medicine?  They need to know if you have any of these conditions:  · an eating disorder, such as anorexia or bulimia  · bipolar disorder or psychosis  · diabetes or high blood sugar, treated with medication  · glaucoma  · head injury or brain tumor  · heart disease, previous heart attack, or irregular heart beat  · high blood pressure  · kidney or liver disease  · seizures (convulsions)  · suicidal thoughts or a previous suicide attempt  · Tourette's syndrome  · weight loss  · an unusual or allergic reaction to bupropion, other medicines, foods, dyes, or preservatives  · breast-feeding  · pregnant or trying to become pregnant  How should I use this medicine?  Take this medicine by mouth with a glass of water. Follow the directions on the prescription label. You can take it with or without food. If it upsets your stomach, take it with food. Do not crush, chew, or cut these tablets. This medicine is taken once daily at the same time each day. Do not take your medicine more often than directed. Do not stop taking this medicine suddenly except upon the advice of your doctor. Stopping this medicine too quickly may cause serious side effects or your condition may worsen.  A special MedGuide will be given to you by the pharmacist with each prescription and refill. Be sure to read this information carefully each time.  Talk to your pediatrician regarding the use of this medicine in children. Special care may be needed.  Overdosage: If you think you have taken too much of this medicine contact a poison control center or emergency room at once.  NOTE: This medicine is only for you. Do not share this medicine with others.  What if I miss a  dose?  If you miss a dose, skip the missed dose and take your next tablet at the regular time. Do not take double or extra doses.  What may interact with this medicine?  Do not take this medicine with any of the following medications:  · linezolid  · MAOIs like Azilect, Carbex, Eldepryl, Marplan, Nardil, and Parnate  · methylene blue (injected into a vein)  · other medicines that contain bupropion like Zyban  This medicine may also interact with the following medications:  · alcohol  · certain medicines for anxiety or sleep  · certain medicines for blood pressure like metoprolol, propranolol  · certain medicines for depression or psychotic disturbances  · certain medicines for HIV or AIDS like efavirenz, lopinavir, nelfinavir, ritonavir  · certain medicines for irregular heart beat like propafenone, flecainide  · certain medicines for Parkinson's disease like amantadine, levodopa  · certain medicines for seizures like carbamazepine, phenytoin, phenobarbital  · cimetidine  · clopidogrel  · cyclophosphamide  · digoxin  · furazolidone  · isoniazid  · nicotine  · orphenadrine  · procarbazine  · steroid medicines like prednisone or cortisone  · stimulant medicines for attention disorders, weight loss, or to stay awake  · tamoxifen  · theophylline  · thiotepa  · ticlopidine  · tramadol  · warfarin  This list may not describe all possible interactions. Give your health care provider a list of all the medicines, herbs, non-prescription drugs, or dietary supplements you use. Also tell them if you smoke, drink alcohol, or use illegal drugs. Some items may interact with your medicine.  What should I watch for while using this medicine?  Tell your doctor if your symptoms do not get better or if they get worse. Visit your doctor or healthcare provider for regular checks on your progress. Because it may take several weeks to see the full effects of this medicine, it is important to continue your treatment as prescribed by your  doctor.  This medicine may cause serious skin reactions. They can happen weeks to months after starting the medicine. Contact your healthcare provider right away if you notice fevers or flu-like symptoms with a rash. The rash may be red or purple and then turn into blisters or peeling of the skin. Or, you might notice a red rash with swelling of the face, lips or lymph nodes in your neck or under your arms.  Patients and their families should watch out for new or worsening thoughts of suicide or depression. Also watch out for sudden changes in feelings such as feeling anxious, agitated, panicky, irritable, hostile, aggressive, impulsive, severely restless, overly excited and hyperactive, or not being able to sleep. If this happens, especially at the beginning of treatment or after a change in dose, call your healthcare provider.  Avoid alcoholic drinks while taking this medicine. Drinking large amounts of alcoholic beverages, using sleeping or anxiety medicines, or quickly stopping the use of these agents while taking this medicine may increase your risk for a seizure.  Do not drive or use heavy machinery until you know how this medicine affects you. This medicine can impair your ability to perform these tasks.  Do not take this medicine close to bedtime. It may prevent you from sleeping.  Your mouth may get dry. Chewing sugarless gum or sucking hard candy, and drinking plenty of water may help. Contact your doctor if the problem does not go away or is severe.  The tablet shell for some brands of this medicine does not dissolve. This is normal. The tablet shell may appear whole in the stool. This is not a cause for concern.  What side effects may I notice from receiving this medicine?  Side effects that you should report to your doctor or health care professional as soon as possible:  · allergic reactions like skin rash, itching or hives, swelling of the face, lips, or tongue  · breathing problems  · changes in  vision  · confusion  · elevated mood, decreased need for sleep, racing thoughts, impulsive behavior  · fast or irregular heartbeat  · hallucinations, loss of contact with reality  · increased blood pressure  · rash, fever, and swollen lymph nodes  · redness, blistering, peeling or loosening of the skin, including inside the mouth  · seizures  · suicidal thoughts or other mood changes  · unusually weak or tired  · vomiting  Side effects that usually do not require medical attention (report to your doctor or health care professional if they continue or are bothersome):  · constipation  · headache  · loss of appetite  · nausea  · tremors  · weight loss  This list may not describe all possible side effects. Call your doctor for medical advice about side effects. You may report side effects to FDA at 9-574-VFA-6756.  Where should I keep my medicine?  Keep out of the reach of children.  Store at room temperature between 15 and 30 degrees C (59 and 86 degrees F). Throw away any unused medicine after the expiration date.  NOTE: This sheet is a summary. It may not cover all possible information. If you have questions about this medicine, talk to your doctor, pharmacist, or health care provider.  ©  [employer]/Gold Standard (2020 13:45:31)  Depression and Anxiety  You will learn about mood disorders, how these conditions can occur in people with heart disease, and why it is important to treat these conditions.  To view the content, go to this web address:  [email or web address]    This video will  on: 2023. If you need access to this video following this date, please reach out to the healthcare provider who assigned it to you.  This information is not intended to replace advice given to you by your health care provider. Make sure you discuss any questions you have with your health care provider.  [employer]’s editorial and clinical teams regularly review and update content to ensure it is  up-to-date with changing practice standards and recognized medical guidelines.  Document Revised: 2021  [employer] Patient Education 2021 Elsevier Inc.  Managing Anxiety  This video describes anxiety and what can be done to manage it.  To view the content, go to this web address:  [email or web address]    This video will  on: 2023. If you need access to this video following this date, please reach out to the healthcare provider who assigned it to you.  This information is not intended to replace advice given to you by your health care provider. Make sure you discuss any questions you have with your health care provider.  [employer]’s editorial and clinical teams regularly review and update content to ensure it is up-to-date with changing practice standards and recognized medical guidelines.  Document Revised: 02/10/2021  [employer] Patient Education 2021 Elsevier Inc.  Understanding Depression  You will learn about depression, including the symptoms, diagnosis and treatment options.  To view the content, go to this web address:  [email or web address]    This video will  on: 2023. If you need access to this video following this date, please reach out to the healthcare provider who assigned it to you.  This information is not intended to replace advice given to you by your health care provider. Make sure you discuss any questions you have with your health care provider.  [employer]’s editorial and clinical teams regularly review and update content to ensure it is up-to-date with changing practice standards and recognized medical guidelines.  Document Revised: 2021  [employer] Patient Education 2021 Elsevier Inc.  Understanding Anxiety  This video describes anxiety and what can be done to manage it.  To view the content, go to this web address:  [email or web address]    This video will  on: 2023. If you need access to this video following this  date, please reach out to the healthcare provider who assigned it to you.  This information is not intended to replace advice given to you by your health care provider. Make sure you discuss any questions you have with your health care provider.  [employer]’s editorial and clinical teams regularly review and update content to ensure it is up-to-date with changing practice standards and recognized medical guidelines.  Document Revised: 05/11/2021  [employer] Patient Education © 2021 [employer]    Insomnia  Insomnia is a sleep disorder that makes it difficult to fall asleep or stay asleep. Insomnia can cause fatigue, low energy, difficulty concentrating, mood swings, and poor performance at work or school.  There are three different ways to classify insomnia:  · Difficulty falling asleep.  · Difficulty staying asleep.  · Waking up too early in the morning.  Any type of insomnia can be long-term (chronic) or short-term (acute). Both are common. Short-term insomnia usually lasts for three months or less. Chronic insomnia occurs at least three times a week for longer than three months.  What are the causes?  Insomnia may be caused by another condition, situation, or substance, such as:  · Anxiety.  · Certain medicines.  · Gastroesophageal reflux disease (GERD) or other gastrointestinal conditions.  · Asthma or other breathing conditions.  · Restless legs syndrome, sleep apnea, or other sleep disorders.  · Chronic pain.  · Menopause.  · Stroke.  · Abuse of alcohol, tobacco, or illegal drugs.  · Mental health conditions, such as depression.  · Caffeine.  · Neurological disorders, such as Alzheimer's disease.  · An overactive thyroid (hyperthyroidism).  Sometimes, the cause of insomnia may not be known.  What increases the risk?  Risk factors for insomnia include:  · Gender. Women are affected more often than men.  · Age. Insomnia is more common as you get older.  · Stress.  · Lack of exercise.  · Irregular work schedule or  working night shifts.  · Traveling between different time zones.  · Certain medical and mental health conditions.  What are the signs or symptoms?  If you have insomnia, the main symptom is having trouble falling asleep or having trouble staying asleep. This may lead to other symptoms, such as:  · Feeling fatigued or having low energy.  · Feeling nervous about going to sleep.  · Not feeling rested in the morning.  · Having trouble concentrating.  · Feeling irritable, anxious, or depressed.  How is this diagnosed?  This condition may be diagnosed based on:  · Your symptoms and medical history. Your health care provider may ask about:  ? Your sleep habits.  ? Any medical conditions you have.  ? Your mental health.  · A physical exam.  How is this treated?  Treatment for insomnia depends on the cause. Treatment may focus on treating an underlying condition that is causing insomnia. Treatment may also include:  · Medicines to help you sleep.  · Counseling or therapy.  · Lifestyle adjustments to help you sleep better.  Follow these instructions at home:  Eating and drinking    · Limit or avoid alcohol, caffeinated beverages, and cigarettes, especially close to bedtime. These can disrupt your sleep.  · Do not eat a large meal or eat spicy foods right before bedtime. This can lead to digestive discomfort that can make it hard for you to sleep.    Sleep habits    · Keep a sleep diary to help you and your health care provider figure out what could be causing your insomnia. Write down:  ? When you sleep.  ? When you wake up during the night.  ? How well you sleep.  ? How rested you feel the next day.  ? Any side effects of medicines you are taking.  ? What you eat and drink.  · Make your bedroom a dark, comfortable place where it is easy to fall asleep.  ? Put up shades or blackout curtains to block light from outside.  ? Use a white noise machine to block noise.  ? Keep the temperature cool.  · Limit screen use before  bedtime. This includes:  ? Watching TV.  ? Using your smartphone, tablet, or computer.  · Stick to a routine that includes going to bed and waking up at the same times every day and night. This can help you fall asleep faster. Consider making a quiet activity, such as reading, part of your nighttime routine.  · Try to avoid taking naps during the day so that you sleep better at night.  · Get out of bed if you are still awake after 15 minutes of trying to sleep. Keep the lights down, but try reading or doing a quiet activity. When you feel sleepy, go back to bed.    General instructions  · Take over-the-counter and prescription medicines only as told by your health care provider.  · Exercise regularly, as told by your health care provider. Avoid exercise starting several hours before bedtime.  · Use relaxation techniques to manage stress. Ask your health care provider to suggest some techniques that may work well for you. These may include:  ? Breathing exercises.  ? Routines to release muscle tension.  ? Visualizing peaceful scenes.  · Make sure that you drive carefully. Avoid driving if you feel very sleepy.  · Keep all follow-up visits as told by your health care provider. This is important.  Contact a health care provider if:  · You are tired throughout the day.  · You have trouble in your daily routine due to sleepiness.  · You continue to have sleep problems, or your sleep problems get worse.  Get help right away if:  · You have serious thoughts about hurting yourself or someone else.  If you ever feel like you may hurt yourself or others, or have thoughts about taking your own life, get help right away. You can go to your nearest emergency department or call:  · Your local emergency services (911 in the [place]  · A suicide crisis helpline, such as the National Suicide Prevention Lifeline at 1-522.618.4371. This is open 24 hours a day.  Summary  · Insomnia is a sleep disorder that makes it difficult to fall  asleep or stay asleep.  · Insomnia can be long-term (chronic) or short-term (acute).  · Treatment for insomnia depends on the cause. Treatment may focus on treating an underlying condition that is causing insomnia.  · Keep a sleep diary to help you and your health care provider figure out what could be causing your insomnia.  This information is not intended to replace advice given to you by your health care provider. Make sure you discuss any questions you have with your health care provider.  Document Revised: 11/30/2018 Document Reviewed: 09/27/2018  Elsevier Patient Education © 2021 Elsevier Inc.

## 2021-12-29 NOTE — PROGRESS NOTES
"Subjective   William Turcios is a 66 y.o. male who presents today for initial evaluation     Referring Provider:  Walt Giraldo MD  [address] [place] [address]    Chief Complaint:  depression    History of Present Illness:  Patient presents today in office with a history of depression, treated with Zoloft as a \"mood stabilizer\" for decades.  No recollection of specific event upon start of medication, though patient agrees to apparent mood fluctuations.  Patient denies other medications trials, therapy, nor psychiatry in the past.  Patient very vague with reason of worsening depression, however, has sought out assistance due to inability to get out of depressive state.  In regards to depression, patient admits symptoms have been persistent since the beginning of April 2021, and worsened since early Nov. 2021.  Explains recent life events have worsened depression.  \"Home issues that seem to be out of control, that got into others hands,\" expresses feelings of no longer being in control and the outcome is unknown.  Admits to having more persistent signs and symptoms of depression.    Persistent worsening symptoms described as, low energy, increase fatigue, changes in appetite, admits to \"emotionally eating and has gained weight\", unexpected weight changes, noted approximately 5 lb weight gain this month, poor sleep, physical symptoms of anxiety, anhedonia, decreased concentration, and passive suicidal ideations.  Reports sleep is erratic, \"not restful\", frequent night time awakenings, sleeps in very short intervals, less than an hour-30-40 minutes, awakens very tired, denies naps. Sleep impairment worsened by RLS, which is managed by medication, as patient further explains time of medication is very important as if patient takes at 6 pm which is ideal patient will be sleeping early and awake more at night, and if takes at 8 pm the RLS symptoms are managed well but still waking up " "often during the night.  Patient states, \"my physical well being is not as well as it was before\".    In regards to physical symptoms of anxiety, patient reports having SOA, heart racing, which occurs 1-2 times/week, lasting up to an hour, however, acute phase of symptoms lasting a few minutes. Denies chest pain, diaphoresis, dizziness, syncope, nor falls.  Has managed these episodes by removing self from anxious situation, which is not always effective.  Patient denies using deep breathing, application of cold/ice to face or neck, or speaking with someone about feelings.  Patient had discussed suicidal thoughts with wife on 12/1/21 and wife had contacted PCP, patient explains wife became concerned and denied action plan or persistent SI. Patient admits to having thoughts of, \"I'd rather not be here\", at times these thoughts linger depending on surrounding events.  Whereas other times the thought is simply passive, which \"usually occurs daily in some form or another.\"  In regards to lingering thoughts, patient admits to thoughts of \" not taking care of self, fading out, just give up.\"  Patient further described these thoughts as stopping physical needs body requires, such as: eating, drinking, adl's. Patient explains, \"I know what I am doing is incorrect and things will get better, I will manage, but it's like the left shoulder side is saying, give up, its negative and right is the positive one, and I tend to follow negative side even though I am aware of what I am supposed to be doing.\"    Patient denies support system, unable to speak with wife,\" as wife is part of the stress\".  Patient does not feel there are any friends, family, or  he can talk to about the change of life events which have caused worsening depression.  Does not want to lean on adult children with the problems either.    Patient explains primary problem as inability to get out of depressive state.          PHQ-9 Depression " Screening  PHQ-9 Total Score: 24    Little interest or pleasure in doing things? 3   Feeling down, depressed, or hopeless? 3   Trouble falling or staying asleep, or sleeping too much? 3   Feeling tired or having little energy? 3   Poor appetite or overeating? 2   Feeling bad about yourself - or that you are a failure or have let yourself or your family down? 2   Trouble concentrating on things, such as reading the newspaper or watching television? 3   Moving or speaking so slowly that other people could have noticed? Or the opposite - being so fidgety or restless that you have been moving around a lot more than usual? 3   Thoughts that you would be better off dead, or of hurting yourself in some way? 2   PHQ-9 Total Score 24     KAVEH-7  Feeling nervous, anxious or on edge: More than half the days  Not being able to stop or control worrying: Nearly every day  Worrying too much about different things: Nearly every day  Trouble Relaxing: Nearly every day  Being so restless that it is hard to sit still: Nearly every day  Feeling afraid as if something awful might happen: Nearly every day  Becoming easily annoyed or irritable: Nearly every day  KAVEH 7 Total Score: 20  If you checked any problems, how difficult have these problems made it for you to do your work, take care of things at home, or get along with other people: Extremely difficult    Past Surgical History:  Past Surgical History:   Procedure Laterality Date   • BACK SURGERY  2000   • CATARACT EXTRACTION, BILATERAL     • COLONOSCOPY  2021   • EYE SURGERY  July-2021   • LAMINECTOMY      lumbar region   • SKIN BIOPSY  2020       Problem List:  Patient Active Problem List   Diagnosis   • Essential hypertension   • High cholesterol   • Dysthymic disorder   • RLS (restless legs syndrome)   • Obstructive sleep apnea (uses a mouthpiece)    • History of basal cell carcinoma       Allergy:   No Known Allergies     Discontinued Medications:  There are no discontinued  medications.    Current Medications:   Current Outpatient Medications   Medication Sig Dispense Refill   • atorvastatin (LIPITOR) 20 MG tablet Take 1 tablet by mouth Daily.     • lisinopril (PRINIVIL,ZESTRIL) 10 MG tablet Take 1 tablet by mouth Daily.     • Pramipexole Dihydrochloride ER 3 MG tablet sustained-release 24 hour Take 1 tablet by mouth Daily. 90 tablet 3   • sertraline (Zoloft) 50 MG tablet Take 1 tablet by mouth Daily. 90 tablet 1   • buPROPion XL (Wellbutrin XL) 150 MG 24 hr tablet Take 1 tablet by mouth Every Morning for 90 days. Indications: Major Depressive Disorder 30 tablet 2     No current facility-administered medications for this visit.       Past Medical History:  Past Medical History:   Diagnosis Date   • Basal cell carcinoma of skin of unspecified ear and external auricular canal    • CTS (carpal tunnel syndrome)     mild   • Dysthymic disorder    • Essential (primary) hypertension    • History of actinic keratoses    • Observed sleep apnea     uses a mouthpiece   • Primary generalized (osteo)arthritis    • Pure hypercholesterolemia    • Restless leg syndrome        Past Psychiatric History:  Began Treatment:has taken Zoloft for decades  Diagnoses:Depression  Psychiatrist:Denies  Therapist:Denies  Admission History:Denies  Medication Trials:n/a  Self Harm: Denies  Suicide Attempts:Denies   Psychosis, Anxiety, Depression: n/a     Substance Abuse History:   Types:Denies all, including illicit  Withdrawal Symptoms:Denies  Longest Period Sober:Not Applicable   AA: Not applicable     Social History:  Martial Status:  Employed:No  Kids:Yes or If so, how many 2 children, 1 boy & 1 girl  House:Lives in a house   History: Army x 25 yrs  Access to Guns:  none    Social History     Socioeconomic History   • Marital status:    Tobacco Use   • Smoking status: Never Smoker   • Smokeless tobacco: Never Used   Vaping Use   • Vaping Use: Never used   Substance and Sexual  "Activity   • Alcohol use: Not Currently     Alcohol/week: 0.0 standard drinks     Comment: rare   • Drug use: Never   • Sexual activity: Not Currently     Partners: Female     Comment: Due to mental health issues       Family History:   Suicide Attempts: Denies  Suicide Completions:Denies      Family History   Problem Relation Age of Onset   • Coronary artery disease Other    • Hypertension Other        Developmental History:   Born: [facility], [place]  Siblings:1 brother  Childhood: Denies Abuse  High School:Completed  College:Masters in Microbiology, Dent[employer] - professional doctorate degree    Mental Status Exam:   Hygiene:   good  Cooperation:  Guarded  Eye Contact:  Good  Psychomotor Behavior:  Appropriate  Affect:  Appropriate  Mood: depressed and anxious  Speech:  Normal  Thought Process:  Goal directed  Thought Content:  Normal  Suicidal:  Suicidal Ideation  Homicidal:  None  Hallucinations:  None  Delusion:  None  Memory:  Intact  Orientation:  Person, Place, Time and Situation  Reliability:  good  Insight:  Good  Judgement:  Good  Impulse Control:  Good  Physical/Medical Issues:  Yes HTN, RLS,HLD,LOTTIE     Review of Systems:  Review of Systems   Constitutional: Positive for activity change, appetite change, fatigue and unexpected weight change.        Comfort foods  Weight gain   Musculoskeletal: Positive for myalgias.   Psychiatric/Behavioral: Positive for decreased concentration and sleep disturbance. Negative for self-injury. The patient is nervous/anxious.          Physical Exam:  Physical Exam    Vital Signs:   /74   Ht 172.7 cm (67.99\")   Wt 100 kg (221 lb)   BMI 33.61 kg/m²      Lab Results:   Office Visit on 10/28/2021   Component Date Value Ref Range Status   • Ferritin 11/01/2021 102.00  30.00 - 400.00 ng/mL Final   Office Visit on 07/22/2021   Component Date Value Ref Range Status   • WBC 11/01/2021 5.93  3.40 - 10.80 10*3/mm3 Final   • RBC 11/01/2021 4.95  4.14 - 5.80 10*6/mm3 Final   • " Hemoglobin 11/01/2021 15.3  13.0 - 17.7 g/dL Final   • Hematocrit 11/01/2021 45.2  37.5 - 51.0 % Final   • MCV 11/01/2021 91.3  79.0 - 97.0 fL Final   • MCH 11/01/2021 30.9  26.6 - 33.0 pg Final   • MCHC 11/01/2021 33.8  31.5 - 35.7 g/dL Final   • RDW 11/01/2021 12.9  12.3 - 15.4 % Final   • RDW-SD 11/01/2021 43.7  37.0 - 54.0 fl Final   • MPV 11/01/2021 10.0  6.0 - 12.0 fL Final   • Platelets 11/01/2021 203  140 - 450 10*3/mm3 Final   • Glucose 11/01/2021 100* 65 - 99 mg/dL Final   • BUN 11/01/2021 21  8 - 23 mg/dL Final   • Creatinine 11/01/2021 0.99  0.76 - 1.27 mg/dL Final   • Sodium 11/01/2021 142  136 - 145 mmol/L Final   • Potassium 11/01/2021 4.5  3.5 - 5.2 mmol/L Final   • Chloride 11/01/2021 107  98 - 107 mmol/L Final   • CO2 11/01/2021 27.9  22.0 - 29.0 mmol/L Final   • Calcium 11/01/2021 9.2  8.6 - 10.5 mg/dL Final   • Total Protein 11/01/2021 6.6  6.0 - 8.5 g/dL Final   • Albumin 11/01/2021 4.40  3.50 - 5.20 g/dL Final   • ALT (SGPT) 11/01/2021 40  1 - 41 U/L Final   • AST (SGOT) 11/01/2021 26  1 - 40 U/L Final   • Alkaline Phosphatase 11/01/2021 67  39 - 117 U/L Final   • Total Bilirubin 11/01/2021 0.3  0.0 - 1.2 mg/dL Final   • eGFR Non African Amer 11/01/2021 76  >60 mL/min/1.73 Final   • Globulin 11/01/2021 2.2  gm/dL Final   • A/G Ratio 11/01/2021 2.0  g/dL Final   • BUN/Creatinine Ratio 11/01/2021 21.2  7.0 - 25.0 Final   • Anion Gap 11/01/2021 7.1  5.0 - 15.0 mmol/L Final   • Total Cholesterol 11/01/2021 171  0 - 200 mg/dL Final   • Triglycerides 11/01/2021 95  0 - 150 mg/dL Final   • HDL Cholesterol 11/01/2021 46  40 - 60 mg/dL Final   • LDL Cholesterol  11/01/2021 107* 0 - 100 mg/dL Final   • VLDL Cholesterol 11/01/2021 18  5 - 40 mg/dL Final   • LDL/HDL Ratio 11/01/2021 2.30   Final   • PSA 11/01/2021 0.635  0.000 - 4.000 ng/mL Final   • TSH 11/01/2021 1.820  0.270 - 4.200 uIU/mL Final       EKG Results:  No orders to display       Imaging Results:  No Images in the past 120 days  found..      Assessment/Plan   Diagnoses and all orders for this visit:    1. Severe episode of recurrent major depressive disorder, without psychotic features (HCC) (Primary)  -     buPROPion XL (Wellbutrin XL) 150 MG 24 hr tablet; Take 1 tablet by mouth Every Morning for 90 days. Indications: Major Depressive Disorder  Dispense: 30 tablet; Refill: 2    2. Generalized anxiety disorder  -     buPROPion XL (Wellbutrin XL) 150 MG 24 hr tablet; Take 1 tablet by mouth Every Morning for 90 days. Indications: Major Depressive Disorder  Dispense: 30 tablet; Refill: 2    3. Insomnia secondary to depression with anxiety        Visit Diagnoses:    ICD-10-CM ICD-9-CM   1. Severe episode of recurrent major depressive disorder, without psychotic features (HCC)  F33.2 296.33   2. Generalized anxiety disorder  F41.1 300.02   3. Insomnia secondary to depression with anxiety  F51.05 300.4    F41.8 327.02       PLAN:  1. Safety: Safety Plan initiated and d/w patient, copy given to pt  2. Therapy: Declines  3. Risk Assessment: Risk of self-harm acutely is high.  Risk factors include anxiety disorder, mood disorder, history and current SI without plan, and recent psychosocial stressors (pandemic). Protective factors include no family history, denies access to guns/weapons, no history of suicide attempts or self-harm in the past, minimal AODA, healthcare seeking, future orientation, willingness to engage in care.  Risk of self-harm chronically is also high, but could be further elevated in the event of treatment noncompliance and/or AODA.  4. Meds:  Start Bupropion (Wellbutrin)  mg by mouth daily in the morning to target depression and anxiety.  Discussed all risks, benefits, alternatives, and side effects of Bupropion including but not limited to GI upset (N/V/D, constipation), tachycardia, diaphoresis, weight loss, agitation, dizziness, headache, insomnia, tremor, blurred vision, anorexia, HTN, activation of mazin or hypomania,  CNS stimulation and neuropsychiatric effects, ocular effects, seizure risk, withdrawal syndrome following abrupt discontinuation, and activation of suicidal ideation and behavior. Pt educated on the need to practice safe sex while taking this med. Discussed the need for pt to immediately call the office for any new or worsening symptoms, such as worsening depression; feeling nervous or restless; suicidal thoughts or actions; or other changes changes in mood or behavior, and all other concerns. Pt educated on med compliance. Pt verbalized understanding and is agreeable to taking Bupropion. Addressed all questions and concerns.     Continue Zoloft 50 mg by mouth daily to target depression and anxiety.  Denies side effects, has been on current dose for several years, decades as patient explains.      5. Labs: n/a    6.  Coping skills were enhanced using mindfulness (deep breathing, progressive muscle relaxation, imagery, grounding & meditation) and cognitive behavioral strategies (reviewing cognitive distortions, negative self-talk/thought stopping and cognitive restructuring, through de-catastrophizing and examining options/alternatives) to build distress tolerance skills and emotional regulation.  Patient encouraged to practice negative thought stopping, in which the patient recognizes negative thoughts early and attempts to replace these thoughts with positive thoughts. Patient given education on medication side effects, diagnosis/illness and relapse symptoms. Plan to continue supportive psychotherapy in next appointment to provide symptom relief.       Patient unwilling to disclose specific life event changes which have caused worsening depression and anxiety symptoms.  Agreeable to adding Wellbutrin XL, will determine of whether to change or taper off Zoloft with next visit.  Patient encouraged to come up with at least one support person and to contact local crisis center and/or ED for further SI or plan of  "action.  Reiterated to patient safety was priority and encouraged positive thoughts.     Patient screened positive for depression based on a PHQ-9 score of 24 on 2021. Follow-up recommendations include: Prescribed antidepressant medication treatment and Suicide Risk Assessment performed.     [facility]  Patient Safety Plan       Patient Name: William Turcios       YOB: 1955    Step 1: Warning Signs (thoughts, images, mood, situation, behavior) that a crisis may be developin. Thoughts \" I would be better off dead\" or \"If I did this, then I would be better off\"; Talking about wanting to die or to kill themselves, talking about feeling hopeless or having no reason to live, talking about feeling trapped or in unbearable pain, talking about being a burden to others, increasing the use of alcohol or drugs, acting anxious or agitated; behaving recklessly, sleeping too little or too much, withdrawing or isolating themselves, extreme mood swings.  7. Behaviors, isolating, more arguments  8. Worsening mood, sadness, fatigue    Step 2: Internal coping strategies-things I can do to take my mind off my problems without contacting another person (relaxation technique, physical activity):     1. mindfulness (deep breathing, progressive muscle relaxation, imagery, grounding & meditation)   2. cognitive behavioral strategies (reviewing cognitive distortions, negative self-talk/thought stopping and cognitive restructuring, through de-catastrophizing and examining options/alternatives)   3. Walk/hike, listen to music, engage in a hobby, exercise, crafts/projects, read a book, journal.     Step 3: People and social settings that provide distraction:     1. Name: none at this time Phone:          2. Place: outside on farm  3. Place: outdoors activity    Step 4: People who I can ask for help:       Step 5: Professionals or agencies I can contact during a crisis:    1. Clinician Name:  Nighat Schrader, " ARAM     Phone: [phone] or [phone]        Clinician Pager or Emergency Contact #     2.   Clinician Name: PCP or therapist      Phone:        Clinician Pager or Emergency Contact #     3. Local Emergency Care Services: [facility]      Emergency Care Services Address: [place] [address]      Emergency Care Services Phones: [phone]    4. 24/7 Suicide Prevention Lifeline Phone: [phone]    5. 24/7 Crisis Text Line Counseling: Text 'HOME' to 280635    Making the Environment Safe:     1.  Keep weapons out of home    The one thing that is most important to me and worth living for is: focus on life and enjoy family.       (From RUTH Hoyt & STERLING Barton. 2011). Safety Planning Intervention: A brief intervention to mitigate suicide risk. Cognitive [facility]al [facility]. 19, [phone]    Patient has verbally agreed to Patient Safety Plan listed above.     This document has been electronically signed by ARAM Jefferson  December 29, 2021 19:21 EST        TREATMENT PLAN/GOALS: Continue supportive psychotherapy efforts and medications as indicated. Treatment and medication options discussed during today's visit. Patient acknowledged and verbally consented to continue with current treatment plan and was educated on the importance of compliance with treatment and follow-up appointments.    MEDICATION ISSUES:  ALFA reviewed as expected.  Discussed medication options and treatment plan of prescribed medication as well as the risks, benefits, and side effects including potential falls, possible impaired driving and metabolic adversities among others. Patient is agreeable to call the office with any worsening of symptoms or onset of side effects. Patient is agreeable to call 911 or go to the nearest ER should he/she begin having SI/HI. No medication side effects or related complaints today.     MEDS ORDERED DURING VISIT:  New Medications Ordered This  Visit   Medications   • buPROPion XL (Wellbutrin XL) 150 MG 24 hr tablet     Sig: Take 1 tablet by mouth Every Morning for 90 days. Indications: Major Depressive Disorder     Dispense:  30 tablet     Refill:  2       Return in about 3 weeks (around 1/19/2022) for Next scheduled follow up.         I spent 72 minutes caring for William on this date of service. This time includes time spent by me in the following activities: preparing for the visit, reviewing tests, obtaining and/or reviewing a separately obtained history, performing a medically appropriate examination and/or evaluation, counseling and educating the patient/family/caregiver, ordering medications, tests, or procedures, referring and communicating with other health care professionals and documenting information in the medical record.      This document has been electronically signed by ARAM Jefferson  December 29, 2021 19:21 EST      Part of this note may be an electronic transcription/translation of spoken language to printed text using the Dragon Dictation System.

## 2022-01-04 ENCOUNTER — HOSPITAL ENCOUNTER (OUTPATIENT)
Dept: SLEEP MEDICINE | Facility: HOSPITAL | Age: 67
Discharge: HOME | End: 2022-01-04
Admitting: FAMILY MEDICINE

## 2022-01-04 DIAGNOSIS — F51.04 PSYCHOPHYSIOLOGICAL INSOMNIA: ICD-10-CM

## 2022-01-04 DIAGNOSIS — G47.8 NON-RESTORATIVE SLEEP: ICD-10-CM

## 2022-01-04 DIAGNOSIS — E66.9 OBESITY WITH SERIOUS COMORBIDITY, UNSPECIFIED CLASSIFICATION, UNSPECIFIED OBESITY TYPE: ICD-10-CM

## 2022-01-04 DIAGNOSIS — G25.81 RESTLESS LEGS SYNDROME (RLS): ICD-10-CM

## 2022-01-04 DIAGNOSIS — G47.30 SLEEP APNEA, UNSPECIFIED TYPE: ICD-10-CM

## 2022-01-04 DIAGNOSIS — G47.10 HYPERSOMNIA: ICD-10-CM

## 2022-01-04 PROCEDURE — 95806 SLEEP STUDY UNATT&RESP EFFT: CPT | Performed by: FAMILY MEDICINE

## 2022-01-04 PROCEDURE — 95806 SLEEP STUDY UNATT&RESP EFFT: CPT

## 2022-01-14 ENCOUNTER — TELEPHONE (OUTPATIENT)
Dept: SLEEP MEDICINE | Facility: HOSPITAL | Age: 67
End: 2022-01-14

## 2022-01-14 DIAGNOSIS — G47.33 OBSTRUCTIVE SLEEP APNEA: Primary | ICD-10-CM

## 2022-01-14 DIAGNOSIS — G47.10 HYPERSOMNIA: ICD-10-CM

## 2022-01-14 DIAGNOSIS — G47.8 NON-RESTORATIVE SLEEP: ICD-10-CM

## 2022-01-18 ENCOUNTER — OFFICE VISIT (OUTPATIENT)
Dept: BEHAVIORAL HEALTH | Facility: CLINIC | Age: 67
End: 2022-01-18

## 2022-01-18 VITALS
DIASTOLIC BLOOD PRESSURE: 80 MMHG | SYSTOLIC BLOOD PRESSURE: 142 MMHG | HEIGHT: 68 IN | WEIGHT: 221 LBS | BODY MASS INDEX: 33.49 KG/M2

## 2022-01-18 DIAGNOSIS — F33.2 SEVERE RECURRENT MAJOR DEPRESSION WITHOUT PSYCHOTIC FEATURES: Primary | ICD-10-CM

## 2022-01-18 DIAGNOSIS — F41.8 INSOMNIA SECONDARY TO DEPRESSION WITH ANXIETY: ICD-10-CM

## 2022-01-18 DIAGNOSIS — F41.1 GENERALIZED ANXIETY DISORDER: ICD-10-CM

## 2022-01-18 DIAGNOSIS — F51.05 INSOMNIA SECONDARY TO DEPRESSION WITH ANXIETY: ICD-10-CM

## 2022-01-18 PROCEDURE — 99213 OFFICE O/P EST LOW 20 MIN: CPT | Performed by: NURSE PRACTITIONER

## 2022-01-18 NOTE — PROGRESS NOTES
"Subjective   William Turcios is a 66 y.o. male who presents today for follow up    Referring Provider:  No referring provider defined for this encounter.    Chief Complaint:  Depression and anxiety follow up since medication changes    History of Present Illness:  Patient with a history of depression, treated with Zoloft as a \"mood stabilizer\" for decades.  No recollection of specific event upon start of medication, though patient agrees to apparent mood fluctuations.  Patient denies other medications trials, therapy, nor psychiatry in the past.  Patient very vague with reason of worsening depression, however, has sought out assistance due to inability to get out of depressive state.  In regards to depression, patient admits symptoms have been persistent since the beginning of April 2021, and worsened since early Nov. 2021.  Explains recent life events have worsened depression.  \"Home issues that seem to be out of control, that got into others hands,\" expresses feelings of no longer being in control and the outcome is unknown.  Admits to having more persistent signs and symptoms of depression.      1/18/22:  Patient presents today in office reporting after first day of taking Wellbutrin  mg felt positive effects on depression and anxiety as well as RLS symptoms.  Reports anxiety symptoms are more manageable.  Currently manages symptoms through time, waiting, which patient did not elaborate. Though describes as: Issues going through a system that's hard for patient to control.  \"I don't think it's correct but I cant do anything about it.\"  Physical symptoms of anxiety \"moderated quite a bit.\" symptoms are now less frequent and stable.   Denies SI, sleep has improved, and has taken steps to make sleep better.     12/29/21:  Persistent worsening symptoms described as, low energy, increase fatigue, changes in appetite, admits to \"emotionally eating and has gained weight\", unexpected weight changes, noted " "approximately 5 lb weight gain this month, poor sleep, physical symptoms of anxiety, anhedonia, decreased concentration, and passive suicidal ideations.  Reports sleep is erratic, \"not restful\", frequent night time awakenings, sleeps in very short intervals, less than an hour-30-40 minutes, awakens very tired, denies naps. Sleep impairment worsened by RLS, which is managed by medication, as patient further explains time of medication is very important as if patient takes at 6 pm which is ideal patient will be sleeping early and awake more at night, and if takes at 8 pm the RLS symptoms are managed well but still waking up often during the night.  Patient states, \"my physical well being is not as well as it was before\".    In regards to physical symptoms of anxiety, patient reports having SOA, heart racing, which occurs 1-2 times/week, lasting up to an hour, however, acute phase of symptoms lasting a few minutes. Denies chest pain, diaphoresis, dizziness, syncope, nor falls.  Has managed these episodes by removing self from anxious situation, which is not always effective.  Patient denies using deep breathing, application of cold/ice to face or neck, or speaking with someone about feelings.  Patient had discussed suicidal thoughts with wife on 12/1/21 and wife had contacted PCP, patient explains wife became concerned and denied action plan or persistent SI. Patient admits to having thoughts of, \"I'd rather not be here\", at times these thoughts linger depending on surrounding events.  Whereas other times the thought is simply passive, which \"usually occurs daily in some form or another.\"  In regards to lingering thoughts, patient admits to thoughts of \" not taking care of self, fading out, just give up.\"  Patient further described these thoughts as stopping physical needs body requires, such as: eating, drinking, adl's. Patient explains, \"I know what I am doing is incorrect and things will get better, I will manage, " "but it's like the left shoulder side is saying, give up, its negative and right is the positive one, and I tend to follow negative side even though I am aware of what I am supposed to be doing.\"    Patient denies support system, unable to speak with wife,\" as wife is part of the stress\".  Patient does not feel there are any friends, family, or  he can talk to about the change of life events which have caused worsening depression.  Does not want to lean on adult children with the problems either.    Patient explains primary problem as inability to get out of depressive state.          PHQ-9 Depression Screening  PHQ-9 Total Score:   12/29/2021 24 , reassess  3/2022    Little interest or pleasure in doing things?     Feeling down, depressed, or hopeless?     Trouble falling or staying asleep, or sleeping too much?     Feeling tired or having little energy?     Poor appetite or overeating?     Feeling bad about yourself - or that you are a failure or have let yourself or your family down?     Trouble concentrating on things, such as reading the newspaper or watching television?     Moving or speaking so slowly that other people could have noticed? Or the opposite - being so fidgety or restless that you have been moving around a lot more than usual?     Thoughts that you would be better off dead, or of hurting yourself in some way?     PHQ-9 Total Score       KAVEH-7    12/29/2021 20 , reassess  3/2022    Past Surgical History:  Past Surgical History:   Procedure Laterality Date   • BACK SURGERY  2000   • CATARACT EXTRACTION, BILATERAL     • COLONOSCOPY  2021   • EYE SURGERY  July-2021   • LAMINECTOMY      lumbar region   • SKIN BIOPSY  2020       Problem List:  Patient Active Problem List   Diagnosis   • Essential hypertension   • High cholesterol   • Dysthymic disorder   • RLS (restless legs syndrome)   • Obstructive sleep apnea   • History of basal cell carcinoma       Allergy:   No Known Allergies "     Discontinued Medications:  There are no discontinued medications.    Current Medications:   Current Outpatient Medications   Medication Sig Dispense Refill   • atorvastatin (LIPITOR) 20 MG tablet Take 1 tablet by mouth Daily.     • buPROPion XL (Wellbutrin XL) 150 MG 24 hr tablet Take 1 tablet by mouth Every Morning for 90 days. Indications: Major Depressive Disorder 30 tablet 2   • lisinopril (PRINIVIL,ZESTRIL) 10 MG tablet Take 1 tablet by mouth Daily.     • Pramipexole Dihydrochloride ER 3 MG tablet sustained-release 24 hour Take 1 tablet by mouth Daily. 90 tablet 3   • sertraline (Zoloft) 50 MG tablet Take 1 tablet by mouth Daily. 90 tablet 1     No current facility-administered medications for this visit.       Past Medical History:  Past Medical History:   Diagnosis Date   • Basal cell carcinoma of skin of unspecified ear and external auricular canal    • CTS (carpal tunnel syndrome)     mild   • Dysthymic disorder    • Essential (primary) hypertension    • History of actinic keratoses    • Observed sleep apnea     uses a mouthpiece   • Primary generalized (osteo)arthritis    • Pure hypercholesterolemia    • Restless leg syndrome        Past Psychiatric History:  Began Treatment:has taken Zoloft for decades  Diagnoses:Depression  Psychiatrist:Denies  Therapist:Denies  Admission History:Denies  Medication Trials:n/a  Self Harm: Denies  Suicide Attempts:Denies   Psychosis, Anxiety, Depression: n/a     Substance Abuse History:   Types:Denies all, including illicit  Withdrawal Symptoms:Denies  Longest Period Sober:Not Applicable   AA: Not applicable     Social History:  Martial Status:  Employed:No  Kids:Yes or If so, how many 2 children, 1 boy & 1 girl  House:Lives in a house   History: Army x 25 yrs  Access to Guns:  none    Social History     Socioeconomic History   • Marital status:    Tobacco Use   • Smoking status: Never Smoker   • Smokeless tobacco: Never Used   Vaping Use    • Vaping Use: Never used   Substance and Sexual Activity   • Alcohol use: Not Currently     Alcohol/week: 0.0 standard drinks     Comment: rare   • Drug use: Never   • Sexual activity: Not Currently     Partners: Female     Comment: Due to mental health issues       Family History:   Suicide Attempts: Denies  Suicide Completions:Denies      Family History   Problem Relation Age of Onset   • Coronary artery disease Other    • Hypertension Other        Developmental History:   Born: [facility]  Siblings:1 brother  Childhood: Denies Abuse  High School:Completed  College:Masters in [employer], [employer] - professional doctorate degree    Mental Status Exam:   Hygiene:   good  Cooperation:  Guarded  Eye Contact:  Good  Psychomotor Behavior:  Appropriate  Affect:  Appropriate  Mood: normal  Speech:  Normal  Thought Process:  Goal directed  Thought Content:  Normal  Suicidal:  None  Homicidal:  None  Hallucinations:  None  Delusion:  None  Memory:  Intact  Orientation:  Person, Place, Time and Situation  Reliability:  good  Insight:  Good  Judgement:  Good  Impulse Control:  Good  Physical/Medical Issues:  Yes HTN, RLS,HLD,LOTTIE     Review of Systems:  Review of Systems   Constitutional: Positive for activity change, appetite change and unexpected weight change.        Comfort foods  Weight gain   Respiratory: Negative for cough and shortness of breath.    Cardiovascular: Negative for chest pain.   Gastrointestinal: Negative.    Musculoskeletal: Positive for myalgias.   Neurological: Negative.    Psychiatric/Behavioral: Positive for sleep disturbance. Negative for self-injury. The patient is nervous/anxious.          Physical Exam:  Physical Exam  Psychiatric:         Attention and Perception: Attention and perception normal.         Mood and Affect: Affect normal. Mood is anxious.         Speech: Speech normal.         Behavior: Behavior normal. Behavior is cooperative.         Thought Content: Thought content normal.    "      Cognition and Memory: Cognition and memory normal.         Judgment: Judgment normal.         Vital Signs:   /80   Ht 172.7 cm (68\")   Wt 100 kg (221 lb)   BMI 33.60 kg/m²      Lab Results:   Office Visit on 10/28/2021   Component Date Value Ref Range Status   • Ferritin 11/01/2021 102.00  30.00 - 400.00 ng/mL Final   Office Visit on 07/22/2021   Component Date Value Ref Range Status   • WBC 11/01/2021 5.93  3.40 - 10.80 10*3/mm3 Final   • RBC 11/01/2021 4.95  4.14 - 5.80 10*6/mm3 Final   • Hemoglobin 11/01/2021 15.3  13.0 - 17.7 g/dL Final   • Hematocrit 11/01/2021 45.2  37.5 - 51.0 % Final   • MCV 11/01/2021 91.3  79.0 - 97.0 fL Final   • MCH 11/01/2021 30.9  26.6 - 33.0 pg Final   • MCHC 11/01/2021 33.8  31.5 - 35.7 g/dL Final   • RDW 11/01/2021 12.9  12.3 - 15.4 % Final   • RDW-SD 11/01/2021 43.7  37.0 - 54.0 fl Final   • MPV 11/01/2021 10.0  6.0 - 12.0 fL Final   • Platelets 11/01/2021 203  140 - 450 10*3/mm3 Final   • Glucose 11/01/2021 100* 65 - 99 mg/dL Final   • BUN 11/01/2021 21  8 - 23 mg/dL Final   • Creatinine 11/01/2021 0.99  0.76 - 1.27 mg/dL Final   • Sodium 11/01/2021 142  136 - 145 mmol/L Final   • Potassium 11/01/2021 4.5  3.5 - 5.2 mmol/L Final   • Chloride 11/01/2021 107  98 - 107 mmol/L Final   • CO2 11/01/2021 27.9  22.0 - 29.0 mmol/L Final   • Calcium 11/01/2021 9.2  8.6 - 10.5 mg/dL Final   • Total Protein 11/01/2021 6.6  6.0 - 8.5 g/dL Final   • Albumin 11/01/2021 4.40  3.50 - 5.20 g/dL Final   • ALT (SGPT) 11/01/2021 40  1 - 41 U/L Final   • AST (SGOT) 11/01/2021 26  1 - 40 U/L Final   • Alkaline Phosphatase 11/01/2021 67  39 - 117 U/L Final   • Total Bilirubin 11/01/2021 0.3  0.0 - 1.2 mg/dL Final   • eGFR Non African Amer 11/01/2021 76  >60 mL/min/1.73 Final   • Globulin 11/01/2021 2.2  gm/dL Final   • A/G Ratio 11/01/2021 2.0  g/dL Final   • BUN/Creatinine Ratio 11/01/2021 21.2  7.0 - 25.0 Final   • Anion Gap 11/01/2021 7.1  5.0 - 15.0 mmol/L Final   • Total Cholesterol " 11/01/2021 171  0 - 200 mg/dL Final   • Triglycerides 11/01/2021 95  0 - 150 mg/dL Final   • HDL Cholesterol 11/01/2021 46  40 - 60 mg/dL Final   • LDL Cholesterol  11/01/2021 107* 0 - 100 mg/dL Final   • VLDL Cholesterol 11/01/2021 18  5 - 40 mg/dL Final   • LDL/HDL Ratio 11/01/2021 2.30   Final   • PSA 11/01/2021 0.635  0.000 - 4.000 ng/mL Final   • TSH 11/01/2021 1.820  0.270 - 4.200 uIU/mL Final       EKG Results:  No orders to display       Imaging Results:  No Images in the past 120 days found..      Assessment/Plan   Diagnoses and all orders for this visit:    1. Severe recurrent major depression without psychotic features (HCC) (Primary)    2. Generalized anxiety disorder    3. Insomnia secondary to depression with anxiety        Visit Diagnoses:    ICD-10-CM ICD-9-CM   1. Severe recurrent major depression without psychotic features (HCC)  F33.2 296.33   2. Generalized anxiety disorder  F41.1 300.02   3. Insomnia secondary to depression with anxiety  F51.05 300.4    F41.8 327.02       PLAN:  1. Safety: Safety Plan initiated and d/w patient, copy given to pt  2. Therapy: Declines  3. Risk Assessment: Risk of self-harm acutely is high.  Risk factors include anxiety disorder, mood disorder, history and current SI without plan, and recent psychosocial stressors (pandemic). Protective factors include no family history, denies access to guns/weapons, no history of suicide attempts or self-harm in the past, minimal AODA, healthcare seeking, future orientation, willingness to engage in care.  Risk of self-harm chronically is also high, but could be further elevated in the event of treatment noncompliance and/or AODA.  4. Meds:  Continue Bupropion (Wellbutrin)  mg by mouth daily in the morning to target depression and anxiety.  Denies side effects, tolerating well.     Continue Zoloft 50 mg by mouth daily to target depression and anxiety.  Denies side effects, has been on current dose for several years, decades  as patient explains.      5. Labs: n/a    6.  Coping skills were enhanced using mindfulness (deep breathing, progressive muscle relaxation, imagery, grounding & meditation) and cognitive behavioral strategies (reviewing cognitive distortions, negative self-talk/thought stopping and cognitive restructuring, through de-catastrophizing and examining options/alternatives) to build distress tolerance skills and emotional regulation.  Patient encouraged to practice negative thought stopping, in which the patient recognizes negative thoughts early and attempts to replace these thoughts with positive thoughts. Patient given education on medication side effects, diagnosis/illness and relapse symptoms. Plan to continue supportive psychotherapy in next appointment to provide symptom relief.     1/18/22:  Patient wishes to remain on current medications regimen, will determine with next visit if dose adjustment is needed.  Patient appears to be a very private person, still vague with history, specific symptoms, and specific coping skills.     12/29/21:   Added Wellbutrin  mg to Zoloft 50 mg.  Patient unwilling to disclose specific life event changes which have caused worsening depression and anxiety symptoms.  Agreeable to adding Wellbutrin XL, will determine of whether to change or taper off Zoloft with next visit.  Patient encouraged to come up with at least one support person and to contact local crisis center and/or ED for further SI or plan of action.  Reiterated to patient safety was priority and encouraged positive thoughts.     Patient screened positive for depression based on a PHQ-9 score of 24 on 12/29/2021. Follow-up recommendations include: Prescribed antidepressant medication treatment and Suicide Risk Assessment performed.     [facility]  Patient Safety Plan       Patient Name: William Turcios       YOB: 1955    Step 1: Warning Signs (thoughts, images, mood, situation, behavior) that  "a crisis may be developin. Thoughts \" I would be better off dead\" or \"If I did this, then I would be better off\"; Talking about wanting to die or to kill themselves, talking about feeling hopeless or having no reason to live, talking about feeling trapped or in unbearable pain, talking about being a burden to others, increasing the use of alcohol or drugs, acting anxious or agitated; behaving recklessly, sleeping too little or too much, withdrawing or isolating themselves, extreme mood swings.  7. Behaviors, isolating, more arguments  8. Worsening mood, sadness, fatigue    Step 2: Internal coping strategies-things I can do to take my mind off my problems without contacting another person (relaxation technique, physical activity):     1. mindfulness (deep breathing, progressive muscle relaxation, imagery, grounding & meditation)   2. cognitive behavioral strategies (reviewing cognitive distortions, negative self-talk/thought stopping and cognitive restructuring, through de-catastrophizing and examining options/alternatives)   3. Walk/hike, listen to music, engage in a hobby, exercise, crafts/projects, read a book, journal.     Step 3: People and social settings that provide distraction:     1. Name: none at this time Phone:          2. Place: outside on farm  3. Place: outdoors activity    Step 4: People who I can ask for help:       Step 5: Professionals or agencies I can contact during a crisis:    1. Clinician Name:  ARAM Jefferson     Phone: [phone] or [phone]        Clinician Pager or Emergency Contact #     2.   Clinician Name: PCP or therapist      Phone:        Clinician Pager or Emergency Contact #     3. Local Emergency Care Services: [facility]      Emergency Care Services Address: [address] [place] [address]      Emergency Care Services Phones: [phone]     Suicide Prevention Lifeline Phone: 1-444.156.6686     Crisis " Text Line Counseling: Text 'HOME' to 070976    Making the Environment Safe:     1.  Keep weapons out of home    The one thing that is most important to me and worth living for is: focus on life and enjoy family.       (From RUTH Hoyt & STERLING Barton. 2011). Safety Planning Intervention: A brief intervention to mitigate suicide risk. Cognitive and Behavioral Practice. 19, [phone]    Patient has verbally agreed to Patient Safety Plan listed above.     This document has been electronically signed by ARAM Jefferson  January 18, 2022 12:00 EST        TREATMENT PLAN/GOALS: Continue supportive psychotherapy efforts and medications as indicated. Treatment and medication options discussed during today's visit. Patient acknowledged and verbally consented to continue with current treatment plan and was educated on the importance of compliance with treatment and follow-up appointments.    MEDICATION ISSUES:  ALFA reviewed as expected.  Discussed medication options and treatment plan of prescribed medication as well as the risks, benefits, and side effects including potential falls, possible impaired driving and metabolic adversities among others. Patient is agreeable to call the office with any worsening of symptoms or onset of side effects. Patient is agreeable to call 911 or go to the nearest ER should he/she begin having SI/HI. No medication side effects or related complaints today.     MEDS ORDERED DURING VISIT:  No orders of the defined types were placed in this encounter.      Return in about 4 weeks (around 2/15/2022) for Next scheduled follow up.         I spent 22 minutes caring for William on this date of service. This time includes time spent by me in the following activities: preparing for the visit, performing a medically appropriate examination and/or evaluation, counseling and educating the patient/family/caregiver, referring and communicating with other health care professionals and documenting information in  the medical record.      This document has been electronically signed by ARAM Jefferson  January 18, 2022 12:00 EST      Part of this note may be an electronic transcription/translation of spoken language to printed text using the Dragon Dictation System.

## 2022-01-18 NOTE — PATIENT INSTRUCTIONS
1.  Please return to clinic at your next scheduled visit.  Contact the clinic ([phone]) at least 24 hours prior in the event you need to cancel.  2.  Do no harm to yourself or others.    3.  Avoid alcohol and drugs.    4.  Take all medications as prescribed.  Please contact the clinic with any concerns. If you are in need of medication refills, please call the clinic at [phone].    5. Should you want to get in touch with your provider, ARAM Jefferson, please utilize edulio or contact the office ([phone]), and staff will be able to page the provider on call directly.  6.  In the event you have personal crisis, contact the following crisis numbers: Suicide Prevention Hotline 1-608.330.4035; ELI Helpline 3-169-624-QINB; [facility] Emergency Room [phone]; text HELLO to 887320; or 481.     SPECIFIC RECOMMENDATIONS:     1.      Medications discussed at this encounter:                   - continue current medications     2.      Psychotherapy recommendations: n/a     3.     Return to clinic: 4 weeks

## 2022-01-24 ENCOUNTER — OFFICE VISIT (OUTPATIENT)
Dept: FAMILY MEDICINE CLINIC | Age: 67
End: 2022-01-24

## 2022-01-24 VITALS
BODY MASS INDEX: 33.62 KG/M2 | WEIGHT: 221.8 LBS | HEIGHT: 68 IN | SYSTOLIC BLOOD PRESSURE: 152 MMHG | TEMPERATURE: 98.5 F | HEART RATE: 70 BPM | DIASTOLIC BLOOD PRESSURE: 75 MMHG

## 2022-01-24 DIAGNOSIS — I10 ESSENTIAL HYPERTENSION: Primary | ICD-10-CM

## 2022-01-24 DIAGNOSIS — F34.1 DYSTHYMIC DISORDER: ICD-10-CM

## 2022-01-24 PROCEDURE — 99213 OFFICE O/P EST LOW 20 MIN: CPT | Performed by: FAMILY MEDICINE

## 2022-01-24 NOTE — ASSESSMENT & PLAN NOTE
Patient's depression is single episode and is severe without psychosis. Their depression is currently in partial remission and the condition is improving with treatment. This will be reassessed at the next regular appointment. F/U as described:patient will continue current medication therapy and patient referred to Mental Health Specialist   ADVISED TO CONTINUE MEDS AND TO CONTINUE SEEING CONSTANTINE SALDANA  .

## 2022-01-24 NOTE — PROGRESS NOTES
Chief Complaint  Depression (follow up)    Subjective          William Turcios presents to [facility]  --TOLERATING BLOOD PRESSURE MEDICATION WITHOUT APPARENT SIDE EFFECTS  --WILLIAM IS NOW SEEING CONSTANTINE GALE FOR HIS DEPRESSION AND IS IMPROVING WITH THE ADDITION OF WELLBUTRIN.          No Known Allergies     Health Maintenance Due   Topic Date Due   • TDAP/TD VACCINES (1 - Tdap) Never done   • Pneumococcal Vaccine 65+ (1 of 1 - PPSV23) Never done   • HEPATITIS C SCREENING  Never done        Current Outpatient Medications on File Prior to Visit   Medication Sig   • atorvastatin (LIPITOR) 20 MG tablet Take 1 tablet by mouth Daily.   • buPROPion XL (Wellbutrin XL) 150 MG 24 hr tablet Take 1 tablet by mouth Every Morning for 90 days. Indications: Major Depressive Disorder   • lisinopril (PRINIVIL,ZESTRIL) 10 MG tablet Take 1 tablet by mouth Daily.   • Pramipexole Dihydrochloride ER 3 MG tablet sustained-release 24 hour Take 1 tablet by mouth Daily.   • sertraline (Zoloft) 50 MG tablet Take 1 tablet by mouth Daily.     No current facility-administered medications on file prior to visit.       Immunization History   Administered Date(s) Administered   • COVID-19 (PFIZER) PURPLE CAP 01/12/2021, 02/02/2021, 08/25/2021   • Influenza, Unspecified 10/19/2020       Review of Systems   Constitutional: Negative for activity change, appetite change, chills, fatigue and fever.   HENT: Negative for congestion, ear pain, rhinorrhea and sore throat.    Respiratory: Negative for cough and shortness of breath.    Cardiovascular: Negative for chest pain, palpitations and leg swelling.   Gastrointestinal: Negative for abdominal pain, constipation, diarrhea, nausea and vomiting.   Musculoskeletal: Negative for arthralgias and myalgias.   Neurological: Negative for headache.        Objective     /75 (BP Location: Left arm, Patient Position: Sitting)   Pulse 70   Temp 98.5 °F (36.9 °C) (Oral)   Ht 172.7  "cm (68\")   Wt 101 kg (221 lb 12.8 oz)   BMI 33.72 kg/m²       Physical Exam  Vitals and nursing note reviewed.   Constitutional:       General: He is not in acute distress.     Appearance: Normal appearance.   Cardiovascular:      Rate and Rhythm: Normal rate and regular rhythm.      Heart sounds: Normal heart sounds. No murmur heard.      Pulmonary:      Effort: Pulmonary effort is normal.      Breath sounds: Normal breath sounds.   Abdominal:      Palpations: Abdomen is soft.      Tenderness: There is no abdominal tenderness.   Musculoskeletal:      Cervical back: Neck supple.      Right lower leg: No edema.      Left lower leg: No edema.   Lymphadenopathy:      Cervical: No cervical adenopathy.   Neurological:      General: No focal deficit present.      Mental Status: He is alert.      Cranial Nerves: No cranial nerve deficit.      Coordination: Coordination normal.      Gait: Gait normal.   Psychiatric:         Mood and Affect: Mood normal.         Behavior: Behavior normal.         Result Review :                             Assessment and Plan      Diagnoses and all orders for this visit:    1. Essential hypertension (Primary)  Assessment & Plan:  Hypertension is improving with treatment.  Continue current treatment regimen.  Dietary sodium restriction.  Weight loss.  Regular aerobic exercise.  Continue current medications.  Blood pressure will be reassessed at the next regular appointment.      2. Dysthymic disorder  Assessment & Plan:  Patient's depression is single episode and is severe without psychosis. Their depression is currently in partial remission and the condition is improving with treatment. This will be reassessed at the next regular appointment. F/U as described:patient will continue current medication therapy and patient referred to Mental Health Specialist   ADVISED TO CONTINUE MEDS AND TO CONTINUE SEEING CONSTANTINE GALE.  .            Follow Up     Return in about 6 months (around " 7/24/2022).    Patient was given instructions and counseling regarding his condition or for health maintenance advice. Please see specific information pulled into the AVS if appropriate.

## 2022-02-08 RX ORDER — LISINOPRIL 10 MG/1
TABLET ORAL
Qty: 90 TABLET | Refills: 0 | Status: SHIPPED | OUTPATIENT
Start: 2022-02-08 | End: 2022-05-09

## 2022-02-08 RX ORDER — ATORVASTATIN CALCIUM 20 MG/1
TABLET, FILM COATED ORAL
Qty: 90 TABLET | Refills: 0 | Status: SHIPPED | OUTPATIENT
Start: 2022-02-08 | End: 2022-05-09

## 2022-02-15 ENCOUNTER — OFFICE VISIT (OUTPATIENT)
Dept: BEHAVIORAL HEALTH | Facility: CLINIC | Age: 67
End: 2022-02-15

## 2022-02-15 VITALS
DIASTOLIC BLOOD PRESSURE: 63 MMHG | SYSTOLIC BLOOD PRESSURE: 140 MMHG | HEIGHT: 68 IN | BODY MASS INDEX: 33.95 KG/M2 | WEIGHT: 224 LBS

## 2022-02-15 DIAGNOSIS — F41.1 GENERALIZED ANXIETY DISORDER: ICD-10-CM

## 2022-02-15 DIAGNOSIS — F33.1 MODERATE EPISODE OF RECURRENT MAJOR DEPRESSIVE DISORDER: ICD-10-CM

## 2022-02-15 DIAGNOSIS — F41.8 INSOMNIA SECONDARY TO DEPRESSION WITH ANXIETY: ICD-10-CM

## 2022-02-15 DIAGNOSIS — F51.05 INSOMNIA SECONDARY TO DEPRESSION WITH ANXIETY: ICD-10-CM

## 2022-02-15 PROCEDURE — 99213 OFFICE O/P EST LOW 20 MIN: CPT | Performed by: NURSE PRACTITIONER

## 2022-02-15 RX ORDER — BUPROPION HYDROCHLORIDE 150 MG/1
150 TABLET ORAL EVERY MORNING
Qty: 90 TABLET | Refills: 2 | Status: SHIPPED | OUTPATIENT
Start: 2022-02-15 | End: 2022-10-25

## 2022-02-15 NOTE — PATIENT INSTRUCTIONS
1.  Please return to clinic at your next scheduled visit.  Contact the clinic ([phone]) at least 24 hours prior in the event you need to cancel.  2.  Do no harm to yourself or others.    3.  Avoid alcohol and drugs.    4.  Take all medications as prescribed.  Please contact the clinic with any concerns. If you are in need of medication refills, please call the clinic at [phone].    5. Should you want to get in touch with your provider, ARAM Jefferson, please utilize [employer] or contact the office ([phone]), and staff will be able to page the provider on call directly.  6.  In the event you have personal crisis, contact the following crisis numbers: Suicide Prevention Hotline 1-820.114.7127; [facility] Helpline 3-751-527-RWDT; [facility] Emergency Room [phone]; text HELLO to 158252; or 787.     SPECIFIC RECOMMENDATIONS:     1.      Medications discussed at this encounter:                   - Continue Wellbutrin  mg by mouth daily in the morning and Zoloft 50 mg by mouth daily     2.      Psychotherapy recommendations: n/a     3.     Return to clinic: 6 weeks

## 2022-02-15 NOTE — PROGRESS NOTES
"Subjective   William Turcios is a 66 y.o. male who presents today for follow up    Referring Provider:  No referring provider defined for this encounter.    Chief Complaint:  Depression and anxiety follow up since medication changes    History of Present Illness:  Patient with a history of depression, treated with Zoloft as a \"mood stabilizer\" for decades.  No recollection of specific event upon start of medication, though patient agrees to apparent mood fluctuations.  Patient denies other medications trials, therapy, nor psychiatry in the past.  Patient very vague with reason of worsening depression, however, has sought out assistance due to inability to get out of depressive state.  In regards to depression, patient admits symptoms have been persistent since the beginning of April 2021, and worsened since early Nov. 2021.  Explains recent life events have worsened depression.  \"Home issues that seem to be out of control, that got into others hands,\" expresses feelings of no longer being in control and the outcome is unknown.  Admits to having more persistent signs and symptoms of depression.    2/15/22: Patient presents today in office reporting continuing current medications without side effects.  Continues to have difficulty sleeping, which can cause irritability. Patient has comorbid conditions which contribute to difficulty sleeping.  Denies SI.  Reports mood is better. Anxiety unchanged. Able to get out of the house and engage in more activity.  Reports home life issues as \"it's going to be a long term thing.\" denies changes or improvement since last visit.  Patient reports having an appointment tomorrow with ENT., Eager to lose weight.       1/18/22:  Patient presents today in office reporting after first day of taking Wellbutrin  mg felt positive effects on depression and anxiety as well as RLS symptoms.  Reports anxiety symptoms are more manageable.  Currently manages symptoms through time, waiting, " "which patient did not elaborate. Though describes as: Issues going through a system that's hard for patient to control.  \"I don't think it's correct but I cant do anything about it.\"  Physical symptoms of anxiety \"moderated quite a bit.\" symptoms are now less frequent and stable.   Denies SI, sleep has improved, and has taken steps to make sleep better.     12/29/21:  Persistent worsening symptoms described as, low energy, increase fatigue, changes in appetite, admits to \"emotionally eating and has gained weight\", unexpected weight changes, noted approximately 5 lb weight gain this month, poor sleep, physical symptoms of anxiety, anhedonia, decreased concentration, and passive suicidal ideations.  Reports sleep is erratic, \"not restful\", frequent night time awakenings, sleeps in very short intervals, less than an hour-30-40 minutes, awakens very tired, denies naps. Sleep impairment worsened by RLS, which is managed by medication, as patient further explains time of medication is very important as if patient takes at 6 pm which is ideal patient will be sleeping early and awake more at night, and if takes at 8 pm the RLS symptoms are managed well but still waking up often during the night.  Patient states, \"my physical well being is not as well as it was before\".    In regards to physical symptoms of anxiety, patient reports having SOA, heart racing, which occurs 1-2 times/week, lasting up to an hour, however, acute phase of symptoms lasting a few minutes. Denies chest pain, diaphoresis, dizziness, syncope, nor falls.  Has managed these episodes by removing self from anxious situation, which is not always effective.  Patient denies using deep breathing, application of cold/ice to face or neck, or speaking with someone about feelings.  Patient had discussed suicidal thoughts with wife on 12/1/21 and wife had contacted PCP, patient explains wife became concerned and denied action plan or persistent SI. Patient admits to " "having thoughts of, \"I'd rather not be here\", at times these thoughts linger depending on surrounding events.  Whereas other times the thought is simply passive, which \"usually occurs daily in some form or another.\"  In regards to lingering thoughts, patient admits to thoughts of \" not taking care of self, fading out, just give up.\"  Patient further described these thoughts as stopping physical needs body requires, such as: eating, drinking, adl's. Patient explains, \"I know what I am doing is incorrect and things will get better, I will manage, but it's like the left shoulder side is saying, give up, its negative and right is the positive one, and I tend to follow negative side even though I am aware of what I am supposed to be doing.\"    Patient denies support system, unable to speak with wife,\" as wife is part of the stress\".  Patient does not feel there are any friends, family, or  he can talk to about the change of life events which have caused worsening depression.  Does not want to lean on adult children with the problems either.    Patient explains primary problem as inability to get out of depressive state.          PHQ-9 Depression Screening  PHQ-9 Total Score:   12/29/2021 24 , reassess  3/2022    Little interest or pleasure in doing things?     Feeling down, depressed, or hopeless?     Trouble falling or staying asleep, or sleeping too much?     Feeling tired or having little energy?     Poor appetite or overeating?     Feeling bad about yourself - or that you are a failure or have let yourself or your family down?     Trouble concentrating on things, such as reading the newspaper or watching television?     Moving or speaking so slowly that other people could have noticed? Or the opposite - being so fidgety or restless that you have been moving around a lot more than usual?     Thoughts that you would be better off dead, or of hurting yourself in some way?     PHQ-9 Total Score   "     KAVEH-7    12/29/2021 20 , reassess  3/2022    Past Surgical History:  Past Surgical History:   Procedure Laterality Date   • BACK SURGERY  2000   • CATARACT EXTRACTION, BILATERAL     • COLONOSCOPY  2021   • EYE SURGERY  July-2021   • LAMINECTOMY      lumbar region   • SKIN BIOPSY  2020       Problem List:  Patient Active Problem List   Diagnosis   • Essential hypertension   • High cholesterol   • Dysthymic disorder   • RLS (restless legs syndrome)   • Obstructive sleep apnea (mouthpiece)    • History of basal cell carcinoma       Allergy:   No Known Allergies     Discontinued Medications:  Medications Discontinued During This Encounter   Medication Reason   • sertraline (Zoloft) 50 MG tablet Reorder   • buPROPion XL (Wellbutrin XL) 150 MG 24 hr tablet Reorder       Current Medications:   Current Outpatient Medications   Medication Sig Dispense Refill   • atorvastatin (LIPITOR) 20 MG tablet TAKE 1 TABLET AT BEDTIME 90 tablet 0   • buPROPion XL (Wellbutrin XL) 150 MG 24 hr tablet Take 1 tablet by mouth Every Morning for 270 days. Indications: Major Depressive Disorder 90 tablet 2   • lisinopril (PRINIVIL,ZESTRIL) 10 MG tablet TAKE 1 TABLET DAILY 90 tablet 0   • Pramipexole Dihydrochloride ER 3 MG tablet sustained-release 24 hour Take 1 tablet by mouth Daily. 90 tablet 3   • sertraline (Zoloft) 50 MG tablet Take 1 tablet by mouth Daily for 270 days. Indications: Generalized Anxiety Disorder, Major Depressive Disorder 90 tablet 2     No current facility-administered medications for this visit.       Past Medical History:  Past Medical History:   Diagnosis Date   • Basal cell carcinoma of skin of unspecified ear and external auricular canal    • CTS (carpal tunnel syndrome)     mild   • Dysthymic disorder    • Essential (primary) hypertension    • History of actinic keratoses    • Observed sleep apnea     uses a mouthpiece   • Primary generalized (osteo)arthritis    • Pure hypercholesterolemia    • Restless leg  syndrome        Past Psychiatric History:  Began Treatment:has taken Zoloft for decades  Diagnoses:Depression  Psychiatrist:Denies  Therapist:Denies  Admission History:Denies  Medication Trials:n/a  Self Harm: Denies  Suicide Attempts:Denies   Psychosis, Anxiety, Depression: n/a     Substance Abuse History:   Types:Denies all, including illicit  Withdrawal Symptoms:Denies  Longest Period Sober:Not Applicable   AA: Not applicable     Social History:  Martial Status:  Employed:No  Kids:Yes or If so, how many 2 children, 1 boy & 1 girl  House:Lives in a house   History: [employer] x 25 yrs  Access to Guns:  none    Social History     Socioeconomic History   • Marital status:    Tobacco Use   • Smoking status: Never Smoker   • Smokeless tobacco: Never Used   Vaping Use   • Vaping Use: Never used   Substance and Sexual Activity   • Alcohol use: Not Currently     Alcohol/week: 0.0 standard drinks     Comment: rare   • Drug use: Never   • Sexual activity: Not Currently     Partners: Female     Comment: Due to mental health issues       Family History:   Suicide Attempts: Denies  Suicide Completions:Denies      Family History   Problem Relation Age of Onset   • Coronary artery disease Other    • Hypertension Other        Developmental History:   Born: [facility]  Siblings:1 brother  Childhood: Denies Abuse  High School:Completed  College:Masters in Micro[employer], Dent[employer] - professional doctorate degree    Mental Status Exam:   Hygiene:   good  Cooperation:  Guarded  Eye Contact:  Good  Psychomotor Behavior:  Appropriate  Affect:  Appropriate  Mood: normal  Speech:  Normal  Thought Process:  Goal directed  Thought Content:  Normal  Suicidal:  None  Homicidal:  None  Hallucinations:  None  Delusion:  None  Memory:  Intact  Orientation:  Person, Place, Time and Situation  Reliability:  good  Insight:  Good  Judgement:  Good  Impulse Control:  Good  Physical/Medical Issues:  Yes HTN, RLS,HLD,LOTTIE  "    Review of Systems:  Review of Systems   Constitutional: Positive for appetite change and unexpected weight change.        Comfort foods  Weight gain   Respiratory: Negative for cough and shortness of breath.    Cardiovascular: Negative for chest pain.   Gastrointestinal: Negative.    Musculoskeletal: Positive for myalgias.   Neurological: Negative.         RLS   Psychiatric/Behavioral: Positive for sleep disturbance. Negative for self-injury. The patient is nervous/anxious.          Physical Exam:  Physical Exam  Psychiatric:         Attention and Perception: Attention and perception normal.         Mood and Affect: Mood and affect normal.         Speech: Speech normal.         Behavior: Behavior normal. Behavior is cooperative.         Thought Content: Thought content normal.         Cognition and Memory: Cognition and memory normal.         Judgment: Judgment normal.         Vital Signs:   /63   Ht 172.7 cm (68\")   Wt 102 kg (224 lb)   BMI 34.06 kg/m²      Lab Results:   Office Visit on 10/28/2021   Component Date Value Ref Range Status   • Ferritin 11/01/2021 102.00  30.00 - 400.00 ng/mL Final       EKG Results:  No orders to display       Imaging Results:  No Images in the past 120 days found..      Assessment/Plan   Diagnoses and all orders for this visit:    1. Moderate episode of recurrent major depressive disorder (HCC)  -     sertraline (Zoloft) 50 MG tablet; Take 1 tablet by mouth Daily for 270 days. Indications: Generalized Anxiety Disorder, Major Depressive Disorder  Dispense: 90 tablet; Refill: 2  -     buPROPion XL (Wellbutrin XL) 150 MG 24 hr tablet; Take 1 tablet by mouth Every Morning for 270 days. Indications: Major Depressive Disorder  Dispense: 90 tablet; Refill: 2    2. Generalized anxiety disorder  -     sertraline (Zoloft) 50 MG tablet; Take 1 tablet by mouth Daily for 270 days. Indications: Generalized Anxiety Disorder, Major Depressive Disorder  Dispense: 90 tablet; Refill: 2  - "     buPROPion XL (Wellbutrin XL) 150 MG 24 hr tablet; Take 1 tablet by mouth Every Morning for 270 days. Indications: Major Depressive Disorder  Dispense: 90 tablet; Refill: 2    3. Insomnia secondary to depression with anxiety        Visit Diagnoses:    ICD-10-CM ICD-9-CM   1. Moderate episode of recurrent major depressive disorder (HCC)  F33.1 296.32   2. Generalized anxiety disorder  F41.1 300.02   3. Insomnia secondary to depression with anxiety  F51.05 300.4    F41.8 327.02       PLAN:  1. Safety: Safety Plan initiated and d/w patient, copy given to pt  2. Therapy: Declines  3. Risk Assessment: Risk of self-harm acutely is high.  Risk factors include anxiety disorder, mood disorder, history and current SI without plan, and recent psychosocial stressors (pandemic). Protective factors include no family history, denies access to guns/weapons, no history of suicide attempts or self-harm in the past, minimal AODA, healthcare seeking, future orientation, willingness to engage in care.  Risk of self-harm chronically is also high, but could be further elevated in the event of treatment noncompliance and/or AODA.  4. Meds:  Continue Bupropion (Wellbutrin)  mg by mouth daily in the morning to target depression and anxiety.  Denies side effects, tolerating well.     Continue Zoloft 50 mg by mouth daily to target depression and anxiety.  Denies side effects, has been on current dose for several years, decades as patient explains.      5. Labs: n/a    2/15/22: continue current medications, patient overall mood appears to be improving, however, expresses limited information in regards to home life and stressors which brought patient to begin treatment in Dec.2021. patient denies need for medication for sleep nor wishes to increase doses of current medication.     1/18/22:  Patient wishes to remain on current medications regimen, will determine with next visit if dose adjustment is needed.  Patient appears to be a very  private person, still vague with history, specific symptoms, and specific coping skills.  Continue Bupropion (Wellbutrin)  mg by mouth daily in the morning to target depression and anxiety.  Denies side effects, tolerating well.   Continue Zoloft 50 mg by mouth daily to target depression and anxiety.  Denies side effects, has been on current dose for several years, decades as patient explains.     Coping skills were enhanced using mindfulness (deep breathing, progressive muscle relaxation, imagery, grounding & meditation) and cognitive behavioral strategies (reviewing cognitive distortions, negative self-talk/thought stopping and cognitive restructuring, through de-catastrophizing and examining options/alternatives) to build distress tolerance skills and emotional regulation.  Patient encouraged to practice negative thought stopping, in which the patient recognizes negative thoughts early and attempts to replace these thoughts with positive thoughts. Patient given education on medication side effects, diagnosis/illness and relapse symptoms. Plan to continue supportive psychotherapy in next appointment to provide symptom relief.     12/29/21:   Added Wellbutrin  mg to Zoloft 50 mg.  Patient unwilling to disclose specific life event changes which have caused worsening depression and anxiety symptoms.  Agreeable to adding Wellbutrin XL, will determine of whether to change or taper off Zoloft with next visit.  Patient encouraged to come up with at least one support person and to contact local crisis center and/or ED for further SI or plan of action.  Reiterated to patient safety was priority and encouraged positive thoughts.     Patient screened positive for depression based on a PHQ-9 score of 24 on 12/29/2021. Follow-up recommendations include: Prescribed antidepressant medication treatment and Suicide Risk Assessment performed.     [facility]  Patient Safety Plan       Patient Name: William DEVLIN  "Tam       YOB: 1955    Step 1: Warning Signs (thoughts, images, mood, situation, behavior) that a crisis may be developin. Thoughts \" I would be better off dead\" or \"If I did this, then I would be better off\"; Talking about wanting to die or to kill themselves, talking about feeling hopeless or having no reason to live, talking about feeling trapped or in unbearable pain, talking about being a burden to others, increasing the use of alcohol or drugs, acting anxious or agitated; behaving recklessly, sleeping too little or too much, withdrawing or isolating themselves, extreme mood swings.  7. Behaviors, isolating, more arguments  8. Worsening mood, sadness, fatigue    Step 2: Internal coping strategies-things I can do to take my mind off my problems without contacting another person (relaxation technique, physical activity):     1. mindfulness (deep breathing, progressive muscle relaxation, imagery, grounding & meditation)   2. cognitive behavioral strategies (reviewing cognitive distortions, negative self-talk/thought stopping and cognitive restructuring, through de-catastrophizing and examining options/alternatives)   3. Walk/hike, listen to music, engage in a hobby, exercise, crafts/projects, read a book, journal.     Step 3: People and social settings that provide distraction:     1. Name: none at this time Phone:          2. Place: outside on farm  3. Place: outdoors activity    Step 4: People who I can ask for help:       Step 5: Professionals or agencies I can contact during a crisis:    1. Clinician Name:  ARAM Jefferson     Phone: [phone] or [phone]        Clinician Pager or Emergency Contact #     2.   Clinician Name: PCP or therapist      Phone:        Clinician Pager or Emergency Contact #     3. Local Emergency Care Services: [facility] Emergency Department      Emergency Care Services Address: [address] [place] [address]      Emergency " [facility] Services Phones: [phone]    4. 24/7 Suicide Prevention Lifeline Phone: [phone]    5. 24/7 Crisis Text Line Counseling: Text 'HOME' to 877359    Making the Environment Safe:     1.  Keep weapons out of home    The one thing that is most important to me and worth living for is: focus on life and enjoy family.       (From RUTH Hoyt & STERLING Barton. 2011). Safety Planning Intervention: A brief intervention to mitigate suicide risk. Cognitive and Behavioral [facility]. 19, [phone]    Patient has verbally agreed to Patient Safety Plan listed above.     This document has been electronically signed by ARAM Jefferson  February 15, 2022 10:32 EST        TREATMENT PLAN/GOALS: Continue supportive psychotherapy efforts and medications as indicated. Treatment and medication options discussed during today's visit. Patient acknowledged and verbally consented to continue with current treatment plan and was educated on the importance of compliance with treatment and follow-up appointments.    MEDICATION ISSUES:  ALFA reviewed as expected.  Discussed medication options and treatment plan of prescribed medication as well as the risks, benefits, and side effects including potential falls, possible impaired driving and metabolic adversities among others. Patient is agreeable to call the office with any worsening of symptoms or onset of side effects. Patient is agreeable to call 911 or go to the nearest ER should he/she begin having SI/HI. No medication side effects or related complaints today.     MEDS ORDERED DURING VISIT:  New Medications Ordered This Visit   Medications   • sertraline (Zoloft) 50 MG tablet     Sig: Take 1 tablet by mouth Daily for 270 days. Indications: Generalized Anxiety Disorder, Major Depressive Disorder     Dispense:  90 tablet     Refill:  2   • buPROPion XL (Wellbutrin XL) 150 MG 24 hr tablet     Sig: Take 1 tablet by mouth Every Morning for 270 days. Indications: Major Depressive Disorder      Dispense:  90 tablet     Refill:  2       Return in about 6 weeks (around 3/29/2022) for Next scheduled follow up.         I spent 22 minutes caring for William on this date of service. This time includes time spent by me in the following activities: preparing for the visit, obtaining and/or reviewing a separately obtained history, performing a medically appropriate examination and/or evaluation, counseling and educating the patient/family/caregiver, ordering medications, tests, or procedures, referring and communicating with other health care professionals and documenting information in the medical record.      This document has been electronically signed by ARAM Jefferson  February 15, 2022 10:32 EST      Part of this note may be an electronic transcription/translation of spoken language to printed text using the Dragon Dictation System.

## 2022-05-09 RX ORDER — LISINOPRIL 10 MG/1
TABLET ORAL
Qty: 90 TABLET | Refills: 3 | Status: SHIPPED | OUTPATIENT
Start: 2022-05-09

## 2022-05-09 RX ORDER — ATORVASTATIN CALCIUM 20 MG/1
TABLET, FILM COATED ORAL
Qty: 90 TABLET | Refills: 3 | Status: SHIPPED | OUTPATIENT
Start: 2022-05-09

## 2022-09-02 RX ORDER — PRAMIPEXOLE 3 MG/1
1 TABLET, EXTENDED RELEASE ORAL DAILY
Qty: 90 TABLET | Refills: 0 | Status: SHIPPED | OUTPATIENT
Start: 2022-09-02 | End: 2022-12-02

## 2022-10-25 DIAGNOSIS — F33.1 MODERATE EPISODE OF RECURRENT MAJOR DEPRESSIVE DISORDER: ICD-10-CM

## 2022-10-25 DIAGNOSIS — F41.1 GENERALIZED ANXIETY DISORDER: ICD-10-CM

## 2022-10-25 RX ORDER — BUPROPION HYDROCHLORIDE 150 MG/1
TABLET ORAL
Qty: 90 TABLET | Refills: 0 | Status: SHIPPED | OUTPATIENT
Start: 2022-10-25

## 2022-10-25 RX ORDER — CIPROFLOXACIN 500 MG/1
TABLET, FILM COATED ORAL
COMMUNITY
Start: 2022-10-16

## 2022-10-25 NOTE — TELEPHONE ENCOUNTER
Med refill Refused patient has not did not keep appt and was last seen in 02/2022.  Please review and sign.

## 2022-12-01 NOTE — TELEPHONE ENCOUNTER
Rx Refill Note  Requested Prescriptions     Pending Prescriptions Disp Refills   • Pramipexole Dihydrochloride ER 3 MG tablet sustained-release 24 hour [Pharmacy Med Name: PRAMIPEXOLE DIHYDRO ER TAB 30 3MG] 90 tablet 3     Sig: TAKE 1 TABLET DAILY (DUE FOR OFFICE VISIT)      Last office visit with prescribing clinician: 1/24/2022   Last telemedicine visit with prescribing clinician: Visit date not found   Next office visit with prescribing clinician: Visit date not found  Last refill sent: 09/02/22, #90    Antiparkinson Dopaminergic Meds Failed     Sneha Muñoz LPN  12/01/22, 10:51 EST

## 2022-12-02 RX ORDER — PRAMIPEXOLE 3 MG/1
TABLET, EXTENDED RELEASE ORAL
Qty: 90 TABLET | Refills: 0 | Status: SHIPPED | OUTPATIENT
Start: 2022-12-02

## 2022-12-30 DIAGNOSIS — F41.1 GENERALIZED ANXIETY DISORDER: ICD-10-CM

## 2022-12-30 DIAGNOSIS — F33.1 MODERATE EPISODE OF RECURRENT MAJOR DEPRESSIVE DISORDER: ICD-10-CM

## 2023-01-05 DIAGNOSIS — F41.1 GENERALIZED ANXIETY DISORDER: ICD-10-CM

## 2023-01-05 DIAGNOSIS — F33.1 MODERATE EPISODE OF RECURRENT MAJOR DEPRESSIVE DISORDER: ICD-10-CM

## 2023-01-05 NOTE — TELEPHONE ENCOUNTER
ATTEMPTED TO CONTACT PT     NO ANSWER      LEFT VOICEMAIL WITH INSTRUCTIONS TO RETURN CALL TO OFFICE AT [phone]  
He will have to contact the pharmacy and update his mailing address, I will order this tomorrow but it will only be for 30 days not 90 as the mail order may require.  Please let me know after he is notified that he needs to contact the pharmacy so the medication is delivered to correct address.
LONNYCB about meds and to update his address with the pharmacy and to let pt know that it will only be for 30 not 90 days like his pharmacy require  
Last appointment 2/2022,now since moved to [place], I cannot see him if he is in another state.   
PT LEFT VOICEMAIL IN RESPONSE TO VOICEMAIL.     PLEASE CALL PT BACK.   
PT SAID THAT HE IS NOT IN [place] ANYMORE, AND THERE IS A LONG LIST FOR APPT, AND PT HAS AN APPT AT THE IN  OF THE MONTH WITH THE NEW PROVIDER AND WOULD LIKE TO NO IF HE CAN GET A 30 DAY SUPPLY, PT SAID THAT HE DOESN'T HAVE ANY OTHER OPTIONS  
PT(PATIENT) EC VERIFIED     PT(PATIENT) WIFE STATES THEY ARE GOING THRU A DIVORCE AND NO LONGER A CONTACT  [phone]    PT(PATIENT) WIFE STATES PT(PATIENT) CAN BE REACHED  [phone]    PT(PATIENT) WIFE ADVISED THAT NUMBER WHEN CALLED STATES IT IS OUT OF SERVICE    PT(PATIENT) WIFE STATES PT(PATIENT) HAS MOVED TO [place]   
Patient called and left a message.  Can patient still get a refill to hold him over until he can find a new provider?  
Per robbie message Patient has moved to [place]  Please review  
Pt said that he would like for it to go to [facility], and his address is [address], [address]  
SPOKE WITH PT ABOUT MEDS AND HIS PHARMACY  
SSRI Protocol Failed 12/30/2022 02:47 AM   Protocol Details  PHQ2 or PHQ9 on record in past 12 months    Blood pressure on record in past 15 months    Recent or future visit with authorizing provider     NEXT APPT WITH PROVIDER  NONE IN [facility]  
Thank you, I will proceed with a 30 day supply and place note to pharmacy informing of address to change and to confirm with patient prior to sending.   
Unfortunately, the last prescription ended early Nov. 2022, indicating he has been out of medication for approximately 2 months, and due to not following up with provider as instructed he would need to be seen.  
Where does he need it sent to, as the current address is in [place] for the mail order pharmacy he previously used.  I can send in 30 days, please update pharmacy and let me know so I can send it today. Thank you  
LABS:                        10.8   8.46  )-----------( 285      ( 21 Dec 2019 14:23 )             36.1     21 Dec 2019 15:54    140    |  103    |  19     ----------------------------<  167    4.4     |  27     |  0.97     Ca    9.0        21 Dec 2019 15:54    TPro  7.3    /  Alb  3.6    /  TBili  0.8    /  DBili  x      /  AST  19     /  ALT  25     /  AlkPhos  77     21 Dec 2019 15:54

## 2023-01-23 DIAGNOSIS — F41.1 GENERALIZED ANXIETY DISORDER: ICD-10-CM

## 2023-01-23 DIAGNOSIS — F33.1 MODERATE EPISODE OF RECURRENT MAJOR DEPRESSIVE DISORDER: ICD-10-CM

## 2023-01-23 NOTE — TELEPHONE ENCOUNTER
Atypical Antidepressants Protocol Failed 01/23/2023 02:57 AM    PHQ-2 or PHQ9 on record within past 12 months    Visit with authorizing provider in past 12 months or upcoming 30 days        NEXT APPT WITH PROVIDER  NONE IN [facility]

## 2023-01-23 NOTE — TELEPHONE ENCOUNTER
ATTEMPTED TO CONTACT PT     NO ANSWER      LEFT VOICEMAIL WITH INSTRUCTIONS TO RETURN CALL TO OFFICE AT [phone]

## 2023-01-24 RX ORDER — BUPROPION HYDROCHLORIDE 150 MG/1
TABLET ORAL
Qty: 90 TABLET | Refills: 3 | OUTPATIENT
Start: 2023-01-24

## 2023-02-02 ENCOUNTER — TELEPHONE (OUTPATIENT)
Dept: FAMILY MEDICINE CLINIC | Age: 68
End: 2023-02-02
Payer: MEDICARE